# Patient Record
Sex: MALE | Race: BLACK OR AFRICAN AMERICAN | Employment: UNEMPLOYED | URBAN - METROPOLITAN AREA
[De-identification: names, ages, dates, MRNs, and addresses within clinical notes are randomized per-mention and may not be internally consistent; named-entity substitution may affect disease eponyms.]

---

## 2019-11-07 ENCOUNTER — HOSPITAL ENCOUNTER (INPATIENT)
Age: 34
LOS: 12 days | Discharge: HOME | DRG: 750 | End: 2019-11-19
Attending: PSYCHIATRY & NEUROLOGY | Admitting: PSYCHIATRY & NEUROLOGY
Payer: MEDICAID

## 2019-11-07 ENCOUNTER — HOSPITAL ENCOUNTER (EMERGENCY)
Age: 34
Discharge: PSYCHIATRIC OR SUBSTANCE USE FACILITY | End: 2019-11-07
Attending: EMERGENCY MEDICINE
Payer: MEDICAID

## 2019-11-07 VITALS
DIASTOLIC BLOOD PRESSURE: 86 MMHG | OXYGEN SATURATION: 100 % | TEMPERATURE: 97.8 F | BODY MASS INDEX: 21.24 KG/M2 | WEIGHT: 160.27 LBS | HEART RATE: 61 BPM | RESPIRATION RATE: 16 BRPM | SYSTOLIC BLOOD PRESSURE: 138 MMHG | HEIGHT: 73 IN

## 2019-11-07 DIAGNOSIS — F22 DELUSIONAL DISORDER (HCC): ICD-10-CM

## 2019-11-07 DIAGNOSIS — Z00.8 MEDICAL CLEARANCE FOR PSYCHIATRIC ADMISSION: Primary | ICD-10-CM

## 2019-11-07 PROBLEM — F20.9 SCHIZOPHRENIA (HCC): Status: ACTIVE | Noted: 2019-11-07

## 2019-11-07 LAB
ALBUMIN SERPL-MCNC: 4.1 G/DL (ref 3.5–5)
ALBUMIN/GLOB SERPL: 1.6 {RATIO} (ref 1.1–2.2)
ALP SERPL-CCNC: 77 U/L (ref 45–117)
ALT SERPL-CCNC: 22 U/L (ref 12–78)
AMPHET UR QL SCN: NEGATIVE
ANION GAP SERPL CALC-SCNC: 6 MMOL/L (ref 5–15)
AST SERPL-CCNC: 18 U/L (ref 15–37)
BARBITURATES UR QL SCN: NEGATIVE
BASOPHILS # BLD: 0.1 K/UL (ref 0–0.1)
BASOPHILS NFR BLD: 1 % (ref 0–1)
BENZODIAZ UR QL: NEGATIVE
BILIRUB SERPL-MCNC: 0.5 MG/DL (ref 0.2–1)
BUN SERPL-MCNC: 10 MG/DL (ref 6–20)
BUN/CREAT SERPL: 10 (ref 12–20)
CALCIUM SERPL-MCNC: 9.1 MG/DL (ref 8.5–10.1)
CANNABINOIDS UR QL SCN: POSITIVE
CHLORIDE SERPL-SCNC: 104 MMOL/L (ref 97–108)
CO2 SERPL-SCNC: 32 MMOL/L (ref 21–32)
COCAINE UR QL SCN: NEGATIVE
CREAT SERPL-MCNC: 1.03 MG/DL (ref 0.7–1.3)
DIFFERENTIAL METHOD BLD: ABNORMAL
DRUG SCRN COMMENT,DRGCM: ABNORMAL
EOSINOPHIL # BLD: 0.1 K/UL (ref 0–0.4)
EOSINOPHIL NFR BLD: 1 % (ref 0–7)
ERYTHROCYTE [DISTWIDTH] IN BLOOD BY AUTOMATED COUNT: 12 % (ref 11.5–14.5)
ETHANOL SERPL-MCNC: <10 MG/DL
GLOBULIN SER CALC-MCNC: 2.6 G/DL (ref 2–4)
GLUCOSE SERPL-MCNC: 79 MG/DL (ref 65–100)
HCT VFR BLD AUTO: 45 % (ref 36.6–50.3)
HGB BLD-MCNC: 15.1 G/DL (ref 12.1–17)
IMM GRANULOCYTES # BLD AUTO: 0.1 K/UL (ref 0–0.04)
IMM GRANULOCYTES NFR BLD AUTO: 1 % (ref 0–0.5)
LYMPHOCYTES # BLD: 3 K/UL (ref 0.8–3.5)
LYMPHOCYTES NFR BLD: 22 % (ref 12–49)
MCH RBC QN AUTO: 29.7 PG (ref 26–34)
MCHC RBC AUTO-ENTMCNC: 33.6 G/DL (ref 30–36.5)
MCV RBC AUTO: 88.6 FL (ref 80–99)
METHADONE UR QL: NEGATIVE
MONOCYTES # BLD: 1.1 K/UL (ref 0–1)
MONOCYTES NFR BLD: 9 % (ref 5–13)
NEUTS SEG # BLD: 9 K/UL (ref 1.8–8)
NEUTS SEG NFR BLD: 66 % (ref 32–75)
NRBC # BLD: 0 K/UL (ref 0–0.01)
NRBC BLD-RTO: 0 PER 100 WBC
OPIATES UR QL: NEGATIVE
PCP UR QL: NEGATIVE
PLATELET # BLD AUTO: 250 K/UL (ref 150–400)
PMV BLD AUTO: 9.5 FL (ref 8.9–12.9)
POTASSIUM SERPL-SCNC: 4.1 MMOL/L (ref 3.5–5.1)
PROT SERPL-MCNC: 6.7 G/DL (ref 6.4–8.2)
RBC # BLD AUTO: 5.08 M/UL (ref 4.1–5.7)
SODIUM SERPL-SCNC: 142 MMOL/L (ref 136–145)
WBC # BLD AUTO: 13.4 K/UL (ref 4.1–11.1)

## 2019-11-07 PROCEDURE — 65220000003 HC RM SEMIPRIVATE PSYCH

## 2019-11-07 PROCEDURE — 80307 DRUG TEST PRSMV CHEM ANLYZR: CPT

## 2019-11-07 PROCEDURE — 36415 COLL VENOUS BLD VENIPUNCTURE: CPT

## 2019-11-07 PROCEDURE — 80053 COMPREHEN METABOLIC PANEL: CPT

## 2019-11-07 PROCEDURE — 74011250636 HC RX REV CODE- 250/636: Performed by: PSYCHIATRY & NEUROLOGY

## 2019-11-07 PROCEDURE — 85025 COMPLETE CBC W/AUTO DIFF WBC: CPT

## 2019-11-07 PROCEDURE — 99285 EMERGENCY DEPT VISIT HI MDM: CPT

## 2019-11-07 RX ORDER — BENZTROPINE MESYLATE 1 MG/1
1 TABLET ORAL
Status: DISCONTINUED | OUTPATIENT
Start: 2019-11-07 | End: 2019-11-19 | Stop reason: HOSPADM

## 2019-11-07 RX ORDER — ADHESIVE BANDAGE
30 BANDAGE TOPICAL DAILY PRN
Status: DISCONTINUED | OUTPATIENT
Start: 2019-11-07 | End: 2019-11-19 | Stop reason: HOSPADM

## 2019-11-07 RX ORDER — IBUPROFEN 200 MG
1 TABLET ORAL DAILY
Status: DISCONTINUED | OUTPATIENT
Start: 2019-11-08 | End: 2019-11-11

## 2019-11-07 RX ORDER — DIPHENHYDRAMINE HYDROCHLORIDE 50 MG/ML
50 INJECTION, SOLUTION INTRAMUSCULAR; INTRAVENOUS
Status: DISCONTINUED | OUTPATIENT
Start: 2019-11-07 | End: 2019-11-19 | Stop reason: HOSPADM

## 2019-11-07 RX ORDER — TRAZODONE HYDROCHLORIDE 50 MG/1
50 TABLET ORAL
Status: DISCONTINUED | OUTPATIENT
Start: 2019-11-07 | End: 2019-11-19 | Stop reason: HOSPADM

## 2019-11-07 RX ORDER — HYDROXYZINE 50 MG/1
50 TABLET, FILM COATED ORAL
Status: DISCONTINUED | OUTPATIENT
Start: 2019-11-07 | End: 2019-11-19 | Stop reason: HOSPADM

## 2019-11-07 RX ORDER — ACETAMINOPHEN 325 MG/1
650 TABLET ORAL
Status: DISCONTINUED | OUTPATIENT
Start: 2019-11-07 | End: 2019-11-19 | Stop reason: HOSPADM

## 2019-11-07 RX ORDER — LORAZEPAM 2 MG/ML
1 INJECTION INTRAMUSCULAR
Status: DISCONTINUED | OUTPATIENT
Start: 2019-11-07 | End: 2019-11-19 | Stop reason: HOSPADM

## 2019-11-07 RX ORDER — HALOPERIDOL 5 MG/ML
5 INJECTION INTRAMUSCULAR
Status: DISCONTINUED | OUTPATIENT
Start: 2019-11-07 | End: 2019-11-19 | Stop reason: HOSPADM

## 2019-11-07 RX ORDER — OLANZAPINE 5 MG/1
5 TABLET ORAL
Status: DISCONTINUED | OUTPATIENT
Start: 2019-11-07 | End: 2019-11-19 | Stop reason: HOSPADM

## 2019-11-07 RX ADMIN — LORAZEPAM 1 MG: 2 INJECTION, SOLUTION INTRAMUSCULAR; INTRAVENOUS at 17:51

## 2019-11-07 NOTE — BH NOTES
TRANSFER - IN REPORT:    Verbal report received from [facility]  on Jaclyn Alfred  being received from [facility] ED(unit) for routine progression of care      Report consisted of patients Situation, Background, Assessment and   Recommendations(SBAR). Information from the following report(s) SBAR, Kardex and MAR was reviewed with the receiving nurse. Opportunity for questions and clarification was provided. Assessment completed upon patients arrival to unit and care assumed.

## 2019-11-07 NOTE — ED NOTES
Report to [employer] at [facility]. Direct line to nurse's station [phone]  TDO has been issued, awaiting [employer] for transport.

## 2019-11-07 NOTE — BH NOTES
Blocked Bed Documentation:    Room number: 726  Type: Behavior  Rationale: Impulsive  Anticipated duration: TBD

## 2019-11-07 NOTE — ED PROVIDER NOTES
EMERGENCY DEPARTMENT HISTORY AND PHYSICAL EXAM      Date: 11/7/2019  Patient Name: Epi Mitchell    History of Presenting Illness     Chief Complaint   Patient presents with   [address] Problem     History Provided By: Patient    HPI: Epi Mitchell, 27 y.o. male with no known past medical history who presents in police custody under an ECO to the ED with cc of verbal altercation with his mother this morning. 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 He denies any current symptoms including pain, vomiting, diarrhea, or head injuries. He does state that he is supposed to see a psychiatrist but does not go. He denies taking any medications. He does state that he has been seen in the past for similar symptoms to today's events and has been hospitalized in a psychiatric facility. He believes the last time this happened was a proximally 9 years ago. PMHx: None  Social Hx: Occasionally smokes, occasional alcohol use, denies illegal drug use    PCP: None    There are no other complaints, changes, or physical findings at this time. No current facility-administered medications on file prior to encounter. No current outpatient medications on file prior to encounter. Past History     Past Medical History:  Past Medical History:   Diagnosis Date    Psychiatric disorder      Past Surgical History:  History reviewed. No pertinent surgical history. Family History:  History reviewed. No pertinent family history.   Social History:  Social History     Tobacco Use    Smoking status: Never Smoker    Smokeless tobacco: Never Used   Substance Use Topics    Alcohol use: Yes     Comment: occasional    Drug use: Not on file     Comment: denied     Allergies:  No Known Allergies  Review of Systems   Review of Systems   Unable to perform ROS: Psychiatric disorder     Physical Exam   Physical Exam   Constitutional: He appears well-developed and well-nourished. HENT:   Head: Normocephalic and atraumatic. Mouth/Throat: Oropharynx is clear and moist and mucous membranes are normal.   Eyes: EOM are normal.   Neck: Normal range of motion and full passive range of motion without pain. Neck supple. Cardiovascular: Normal rate, regular rhythm, normal heart sounds, intact distal pulses and normal pulses. No murmur heard. Pulmonary/Chest: Effort normal and breath sounds normal. No respiratory distress. He exhibits no tenderness. Abdominal: Soft. Normal appearance and bowel sounds are normal. There is no tenderness. There is no rebound and no guarding. Neurological: He is alert. He has normal strength. Skin: Skin is warm, dry and intact. No rash noted. No erythema. Psychiatric: He has a normal mood and affect. Judgment normal. He is actively hallucinating. Thought content is delusional. He expresses no homicidal and no suicidal ideation. He expresses no suicidal plans and no homicidal plans. Delusional and tangential   Nursing note and vitals reviewed. Diagnostic Study Results   Labs -     Recent Results (from the past 12 hour(s))   ETHYL ALCOHOL    Collection Time: 11/07/19 12:05 PM   Result Value Ref Range    ALCOHOL(ETHYL),SERUM <10 <10 MG/DL   CBC WITH AUTOMATED DIFF    Collection Time: 11/07/19 12:05 PM   Result Value Ref Range    WBC 13.4 (H) 4.1 - 11.1 K/uL    RBC 5.08 4.10 - 5.70 M/uL    HGB 15.1 12.1 - 17.0 g/dL    HCT 45.0 36.6 - 50.3 %    MCV 88.6 80.0 - 99.0 FL    MCH 29.7 26.0 - 34.0 PG    MCHC 33.6 30.0 - 36.5 g/dL    RDW 12.0 11.5 - 14.5 %    PLATELET 030 364 - 855 K/uL    MPV 9.5 8.9 - 12.9 FL    NRBC 0.0 0  WBC    ABSOLUTE NRBC 0.00 0.00 - 0.01 K/uL    NEUTROPHILS 66 32 - 75 %    LYMPHOCYTES 22 12 - 49 %    MONOCYTES 9 5 - 13 %    EOSINOPHILS 1 0 - 7 %    BASOPHILS 1 0 - 1 %    IMMATURE GRANULOCYTES 1 (H) 0.0 - 0.5 %    ABS. NEUTROPHILS 9.0 (H) 1.8 - 8.0 K/UL    ABS.  LYMPHOCYTES 3.0 0.8 - 3.5 K/UL    ABS. MONOCYTES 1.1 (H) 0.0 - 1.0 K/UL    ABS. EOSINOPHILS 0.1 0.0 - 0.4 K/UL    ABS. BASOPHILS 0.1 0.0 - 0.1 K/UL    ABS. IMM. GRANS. 0.1 (H) 0.00 - 0.04 K/UL    DF AUTOMATED     METABOLIC PANEL, COMPREHENSIVE    Collection Time: 11/07/19 12:05 PM   Result Value Ref Range    Sodium 142 136 - 145 mmol/L    Potassium 4.1 3.5 - 5.1 mmol/L    Chloride 104 97 - 108 mmol/L    CO2 32 21 - 32 mmol/L    Anion gap 6 5 - 15 mmol/L    Glucose 79 65 - 100 mg/dL    BUN 10 6 - 20 MG/DL    Creatinine 1.03 0.70 - 1.30 MG/DL    BUN/Creatinine ratio 10 (L) 12 - 20      GFR est AA >60 >60 ml/min/1.73m2    GFR est non-AA >60 >60 ml/min/1.73m2    Calcium 9.1 8.5 - 10.1 MG/DL    Bilirubin, total 0.5 0.2 - 1.0 MG/DL    ALT (SGPT) 22 12 - 78 U/L    AST (SGOT) 18 15 - 37 U/L    Alk. phosphatase 77 45 - 117 U/L    Protein, total 6.7 6.4 - 8.2 g/dL    Albumin 4.1 3.5 - 5.0 g/dL    Globulin 2.6 2.0 - 4.0 g/dL    A-G Ratio 1.6 1.1 - 2.2     DRUG SCREEN, URINE    Collection Time: 11/07/19 12:05 PM   Result Value Ref Range    AMPHETAMINES NEGATIVE  NEG      BARBITURATES NEGATIVE  NEG      BENZODIAZEPINES NEGATIVE  NEG      COCAINE NEGATIVE  NEG      METHADONE NEGATIVE  NEG      OPIATES NEGATIVE  NEG      PCP(PHENCYCLIDINE) NEGATIVE  NEG      THC (TH-CANNABINOL) POSITIVE (A) NEG      Drug screen comment (NOTE)        Radiologic Studies -   No orders to display     No results found. Medical Decision Making   I am the first provider for this patient. I reviewed the vital signs, available nursing notes, past medical history, past surgical history, family history and social history. Vital Signs-Reviewed the patient's vital signs.   Patient Vitals for the past 12 hrs:   Temp Pulse Resp BP SpO2   11/07/19 1448  65  128/84 100 %   11/07/19 1248 98.1 °F (36.7 °C) 61 16 140/77 100 %   11/07/19 1138 98.3 °F (36.8 °C) 66 16 126/75 100 %     Pulse Oximetry Analysis - 100% on RA    Records Reviewed: Nursing Notes    Provider Notes (Medical Decision Making):   26-year-old male presents in police custody under an ECO for medical clearance and psychiatric evaluation. It sounds like he has a history of either bipolar or schizophrenia/schizoaffective disorder. He is clearly delusional and not take any medications. He will need psychiatric admission. Will medically clear and defer psychiatric disposition to [facility] crisis. ED Course:   Initial assessment performed. The patients presenting problems have been discussed, and they are in agreement with the care plan formulated and outlined with them. I have encouraged them to ask questions as they arise throughout their visit. Progress Note  3:21 PM  I have been informed by nursing that patient was accepted by Dr. Eber Gordon under a TDO at [facility] for inpatient psychiatry. Progress Note:   Updated pt on all returned results and findings. Discussed the importance of proper follow up as referred below along with return precautions. Pt in agreement with the care plan and expresses agreement with and understanding of all items discussed. Disposition:  Transfer Note:  3:22 PM  Patient is being transferred to inpatient psychiatry at [facility] under a TDO, transfer accepted by Dr. Eber Gordon. PLAN:  1. Transfer    Diagnosis     Clinical Impression:   1. Medical clearance for psychiatric admission    2. Delusional disorder [facility])            Please note that this dictation was completed with Dragon, computer voice recognition software. Quite often unanticipated grammatical, syntax, homophones, and other interpretive errors are inadvertently transcribed by the computer software. Please disregard these errors. Additionally, please excuse any errors that have escaped final proofreading.

## 2019-11-07 NOTE — BH NOTES
Primary Nurse Tony Benito RN and David Srivastava performed a dual skin assessment on this patient No impairment noted  Jose score is 23    Callus ~5cm mid bottom of left foot  Two dime size purple bruises right upper arm  Sporadic bumps upper back, skin intact    Pressure Injury Documentation  (COMPLETE ONE LABEL PER PRESSURE INJURY)  For further information, please review corresponding Wound Care flowsheet.       Flora Dhaliwal has:    No pressure injury    Tony Benito RN

## 2019-11-07 NOTE — ED NOTES
[employer] officer checked patent upon arrival to [facility] 1. Patient cooperative on arrival to [facility]. Patient's belongings removed and placed in labeled bags; belongings secured in [address] [place] [address] Road locker.

## 2019-11-07 NOTE — BH NOTES
Pt arrived around [phone] under TDO    Pt tracking the room, appearing fearful, unable to follow simple instructions such as vital sign assessment, pt became increasingly agitated \"I am just here for my blood pressure to be checked\"    PRN Medication Documentation    Specific patient behavior that led to need for PRN medication: see above  Staff interventions attempted prior to PRN being given: decreased stimuli, offered emotional support  PRN medication given: ativan 1mg IM at 1751 pt readily exposed skin for administration   Patient response/effectiveness of PRN medication: 40 minutes postadministration pt is eating dinner, has been oriented to unit and verbalizes understanding of confidentiality code.      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

## 2019-11-07 NOTE — ED TRIAGE NOTES
Pt arrived in custody of [place] [employer] on an ECO initiated at 56 by patient's mother. Per PD, pt cooperative en route. PD reported pt demonstrated delusions en route.

## 2019-11-07 NOTE — ED NOTES
Rut Elliott with [employer] spoke with mother again, apparently patient does not have consistent outpatient follow up and has not been prescribed any regular medications over the past year.

## 2019-11-07 NOTE — ED NOTES
Patient with hx of mental health problems, follow up unknown, medications unknown arrives in custody of Onofre FLOYD. Patient lives at home with mother. Mother indicated (to [facility]) that rent prices were increasing and they had been planning a move for several months. No other siblings in household. Patient has flat, monotone affect. Answers questions appropriately and follows commands. Makes direct eye contact with staff. Patient alert and oriented to name, place, time. Fixated on today's events which included his mother packing up their personal property in preparation for a local move. Patient denies auditory, visual or tactile hallucinations. demonstrates delusions of grandeur and states, \"I own [address], people just don't know. \"    Patient also demonstrates paranoid delusions and states, \"I need ya'll to contact the [employer] about her moving. \"  Additionally states, \"I own the whole community, I can't keep moving. \"      When staff inquired about medications and medical history patient stated, \"I haven't been getting my proper medication since 1990. \"      Denies changes to sleep patterns. Reports increased appetite. Demonstrates labile affect and mood. Emergency Department Nursing Plan of Care       The Nursing Plan of Care is developed from the Nursing assessment and Emergency Department Attending provider initial evaluation. The plan of care may be reviewed in the ED Provider note.     The Plan of Care was developed with the following considerations:   Patient / Family readiness to learn indicated by:verbalized understanding  Persons(s) to be included in education: patient  Barriers to Learning/Limitations:No    [address], RN    11/7/2019   12:22 PM

## 2019-11-07 NOTE — ED NOTES
TRANSFER - OUT REPORT:    Verbal report given to Ruthie Daugherty RN (name) on Kathi Mi  being transferred to [facility] bed 726, A (accepted by Dr. Keith Solis) for routine progression of care       Report consisted of patients Situation, Background, Assessment and   Recommendations(SBAR). Information from the following report(s) SBAR was reviewed with the receiving nurse. Lines:       Opportunity for questions and clarification was provided. Patient transported with:  TDO admit, awaiting [place] [employer] for transport.

## 2019-11-08 PROBLEM — D72.829 LEUKOCYTOSIS: Status: ACTIVE | Noted: 2019-11-08

## 2019-11-08 PROBLEM — F12.90 MARIJUANA USE: Status: ACTIVE | Noted: 2019-11-08

## 2019-11-08 LAB
BASOPHILS # BLD: 0.1 K/UL (ref 0–0.1)
BASOPHILS NFR BLD: 1 % (ref 0–1)
CHOLEST SERPL-MCNC: 145 MG/DL
DIFFERENTIAL METHOD BLD: ABNORMAL
EOSINOPHIL # BLD: 0.1 K/UL (ref 0–0.4)
EOSINOPHIL NFR BLD: 1 % (ref 0–7)
ERYTHROCYTE [DISTWIDTH] IN BLOOD BY AUTOMATED COUNT: 11.9 % (ref 11.5–14.5)
GLUCOSE P FAST SERPL-MCNC: 88 MG/DL (ref 65–100)
HCT VFR BLD AUTO: 42.4 % (ref 36.6–50.3)
HDLC SERPL-MCNC: 44 MG/DL
HDLC SERPL: 3.3 {RATIO} (ref 0–5)
HGB BLD-MCNC: 14.4 G/DL (ref 12.1–17)
IMM GRANULOCYTES # BLD AUTO: 0 K/UL (ref 0–0.04)
IMM GRANULOCYTES NFR BLD AUTO: 0 % (ref 0–0.5)
LDLC SERPL CALC-MCNC: 86.6 MG/DL (ref 0–100)
LIPID PROFILE,FLP: NORMAL
LYMPHOCYTES # BLD: 2.9 K/UL (ref 0.8–3.5)
LYMPHOCYTES NFR BLD: 24 % (ref 12–49)
MCH RBC QN AUTO: 29.4 PG (ref 26–34)
MCHC RBC AUTO-ENTMCNC: 34 G/DL (ref 30–36.5)
MCV RBC AUTO: 86.7 FL (ref 80–99)
MONOCYTES # BLD: 1 K/UL (ref 0–1)
MONOCYTES NFR BLD: 9 % (ref 5–13)
NEUTS SEG # BLD: 7.8 K/UL (ref 1.8–8)
NEUTS SEG NFR BLD: 65 % (ref 32–75)
NRBC # BLD: 0 K/UL (ref 0–0.01)
NRBC BLD-RTO: 0 PER 100 WBC
PLATELET # BLD AUTO: 249 K/UL (ref 150–400)
PMV BLD AUTO: 9.4 FL (ref 8.9–12.9)
RBC # BLD AUTO: 4.89 M/UL (ref 4.1–5.7)
TRIGL SERPL-MCNC: 72 MG/DL (ref ?–150)
TSH SERPL DL<=0.05 MIU/L-ACNC: 1.75 UIU/ML (ref 0.36–3.74)
VLDLC SERPL CALC-MCNC: 14.4 MG/DL
WBC # BLD AUTO: 11.8 K/UL (ref 4.1–11.1)

## 2019-11-08 PROCEDURE — 65220000003 HC RM SEMIPRIVATE PSYCH

## 2019-11-08 PROCEDURE — 80061 LIPID PANEL: CPT

## 2019-11-08 PROCEDURE — 82947 ASSAY GLUCOSE BLOOD QUANT: CPT

## 2019-11-08 PROCEDURE — 36415 COLL VENOUS BLD VENIPUNCTURE: CPT

## 2019-11-08 PROCEDURE — 85025 COMPLETE CBC W/AUTO DIFF WBC: CPT

## 2019-11-08 PROCEDURE — 84443 ASSAY THYROID STIM HORMONE: CPT

## 2019-11-08 PROCEDURE — 74011250637 HC RX REV CODE- 250/637: Performed by: NURSE PRACTITIONER

## 2019-11-08 RX ORDER — DIVALPROEX SODIUM 500 MG/1
1000 TABLET, EXTENDED RELEASE ORAL
Status: DISCONTINUED | OUTPATIENT
Start: 2019-11-08 | End: 2019-11-13

## 2019-11-08 RX ORDER — RISPERIDONE 1 MG/1
1 TABLET, FILM COATED ORAL 2 TIMES DAILY
Status: DISCONTINUED | OUTPATIENT
Start: 2019-11-08 | End: 2019-11-08

## 2019-11-08 RX ORDER — RISPERIDONE 1 MG/1
1 TABLET, FILM COATED ORAL 2 TIMES DAILY
Status: DISCONTINUED | OUTPATIENT
Start: 2019-11-08 | End: 2019-11-09

## 2019-11-08 RX ORDER — HALOPERIDOL 5 MG/ML
5 INJECTION INTRAMUSCULAR 2 TIMES DAILY
Status: DISCONTINUED | OUTPATIENT
Start: 2019-11-08 | End: 2019-11-09

## 2019-11-08 RX ADMIN — RISPERIDONE 1 MG: 1 TABLET ORAL at 16:19

## 2019-11-08 RX ADMIN — DIVALPROEX SODIUM 1000 MG: 500 TABLET, EXTENDED RELEASE ORAL at 21:06

## 2019-11-08 RX ADMIN — RISPERIDONE 1 MG: 1 TABLET ORAL at 21:06

## 2019-11-08 NOTE — PROGRESS NOTES
Cooperative. Medication compliant. Lacking insight regarding mental illness and attributing factors to admission. Verbalizing grandiose delusions at times.        Problem: Altered Thought Process (Adult/Pediatric)  Goal: *STG: Participates in treatment plan  Outcome: Progressing Towards Goal  Goal: *STG: Remains safe in hospital  Outcome: Progressing Towards Goal  Goal: *STG: Complies with medication therapy  Outcome: Progressing Towards Goal  Goal: *STG: Absence of lethality  Outcome: Progressing Towards Goal

## 2019-11-08 NOTE — PROGRESS NOTES
Malodorous, verbalizing multiple delusions of grandeur, nonsensical statements, tracking room in suspicious manner.    Problem: Altered Thought Process (Adult/Pediatric)  Goal: *STG: Absence of lethality  Outcome: Progressing Towards Goal     Problem: Altered Thought Process (Adult/Pediatric)  Goal: *STG: Decreased delusional thinking  Outcome: Not Progressing Towards Goal  Goal: *STG: Demonstrates ability to understand and use improved judgment in daily activities and relationships  Outcome: Not Progressing Towards Goal

## 2019-11-08 NOTE — BH NOTES
Dr. Edilson Bermudez, triage hospitialist paged related to H and P  Per Dr. Pauline Woodson, floor consult hospitalist, Dr. Edilson Bermudez to be made aware of consult  Dr. Edilson Bermudez requested H and P consult be called again morning of 11/8/19

## 2019-11-08 NOTE — PROGRESS NOTES
Problem: Falls - Risk of  Goal: *Absence of Falls  Description  Document Rebbecca Persons Fall Risk and appropriate interventions in the flowsheet. Outcome: Progressing Towards Goal  Note:   Fall Risk Interventions:            Medication Interventions: Teach patient to arise slowly, Patient to call before getting OOB        Resting in bed with eyes closed, no complaints, no distress noted. Safety measures in place. Will continue to monitor.           Blocked Bed Documentation:     Room number: 726  Type: Behavior  Rationale: Impulsive  Anticipated duration: TBD

## 2019-11-08 NOTE — PROGRESS NOTES
Admission Medication Reconciliation:    Information obtained from:  Patient interview, review of insurance claims data (none), and review of [facility]   RxQuery data available¹:  NO    Comments/Recommendations: Updated PTA meds/reviewed patient's allergies. 1)  Difficult to interview the patient given grandiose delusional thinking. 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 \"     The patient denies taking any prescription medications prior to admission. 2)  Medication changes (since last review): none    3)  The [place] Prescription Monitoring Program () was assessed to determine fill history of any controlled medications. Unable to locate the patient in the database. ¹RxQuery pharmacy benefit data reflects medications filled and processed through the patient's insurance, however   this data does NOT capture whether the medication was picked up or is currently being taken by the patient. Allergies:  Patient has no known allergies. Significant PMH/Disease States:   Past Medical History:   Diagnosis Date    Psychiatric disorder      Chief Complaint for this Admission:  No chief complaint on file.     Prior to Admission Medications:   None     Jarrett Najeras [place]

## 2019-11-08 NOTE — BH NOTES
1735- pt comes out of his room and asks to be discharged. Education provided. Pt return to his room. Pt appears distracted. Delusions of grandeur , poor insight. Restless distracted.

## 2019-11-08 NOTE — PROGRESS NOTES
Laboratory Monitoring for Antipsychotics: This patient is currently prescribed the following medication(s):   Current Facility-Administered Medications   Medication Dose Route Frequency    risperiDONE (RisperDAL) tablet 1 mg  1 mg Oral BID    divalproex ER (DEPAKOTE ER) 24 hour tablet 1,000 mg  1,000 mg Oral QHS    nicotine (NICODERM CQ) 21 mg/24 hr patch 1 Patch  1 Patch TransDERmal DAILY     The following labs have been completed for monitoring of antipsychotics and/or mood stabilizers:    Height, Weight, BMI Estimation  Estimated body mass index is 21.11 kg/m² as calculated from the following:    Height as of this encounter: 185.4 cm (73\"). Weight as of this encounter: 72.6 kg (160 lb). Vital Signs/Blood Pressure  Visit Vitals  /86   Pulse 61   Temp 98.3 °F (36.8 °C)   Resp 16   Ht 185.4 cm (73\")   Wt 72.6 kg (160 lb)   SpO2 98%   BMI 21.11 kg/m²     Renal Function, Hepatic Function and Chemistry  Estimated Creatinine Clearance: 102.8 mL/min (based on SCr of 1.03 mg/dL). Lab Results   Component Value Date/Time    Sodium 142 11/07/2019 12:05 PM    Potassium 4.1 11/07/2019 12:05 PM    Chloride 104 11/07/2019 12:05 PM    CO2 32 11/07/2019 12:05 PM    Anion gap 6 11/07/2019 12:05 PM    BUN 10 11/07/2019 12:05 PM    Creatinine 1.03 11/07/2019 12:05 PM    BUN/Creatinine ratio 10 (L) 11/07/2019 12:05 PM    Bilirubin, total 0.5 11/07/2019 12:05 PM    Protein, total 6.7 11/07/2019 12:05 PM    Albumin 4.1 11/07/2019 12:05 PM    Globulin 2.6 11/07/2019 12:05 PM    A-G Ratio 1.6 11/07/2019 12:05 PM    ALT (SGPT) 22 11/07/2019 12:05 PM    Alk.  phosphatase 77 11/07/2019 12:05 PM     Lab Results   Component Value Date/Time    Glucose 88 11/08/2019 05:49 AM    Glucose (POC) 82 09/09/2009 05:35 PM     No results found for: HBA1C, HGBE8, BEQ1RHPF    Hematology  Lab Results   Component Value Date/Time    WBC 13.4 (H) 11/07/2019 12:05 PM    RBC 5.08 11/07/2019 12:05 PM    HGB 15.1 11/07/2019 12:05 PM    HCT 45.0 11/07/2019 12:05 PM    MCV 88.6 11/07/2019 12:05 PM    MCH 29.7 11/07/2019 12:05 PM    MCHC 33.6 11/07/2019 12:05 PM    RDW 12.0 11/07/2019 12:05 PM    PLATELET 063 66/38/6322 12:05 PM     Lipids  Lab Results   Component Value Date/Time    Cholesterol, total 145 11/08/2019 05:49 AM    HDL Cholesterol 44 11/08/2019 05:49 AM    LDL, calculated 86.6 11/08/2019 05:49 AM    Triglyceride 72 11/08/2019 05:49 AM    CHOL/HDL Ratio 3.3 11/08/2019 05:49 AM     Thyroid Function  Lab Results   Component Value Date/Time    TSH 1.75 11/08/2019 05:49 AM     Assessment/Plan:  Recommended baseline laboratory monitoring has been completed based on this patient's current medication regimen. No further interventions are needed at this time. Follow-up metabolic monitoring labs should be completed in 3 months (quarterly for the first year of antipsychotic therapy).      Maya Sosa, SIMIND

## 2019-11-08 NOTE — BH NOTES
PSYCHOSOCIAL ASSESSMENT  :Patient identifying info:  Gregorio Anton is a 27 y.o., male admitted 2019  5:29 PM     Presenting problem and precipitating factors: He was assessed at [facility] at the [facility] due to agitated and threatening behavior. Sent to [facility] on a TDO. He is very delusional \" I own a hospital\" . Mental status assessment:alert - oriented x's 3 - delusional and guarded     Strengths: supportive mother - safe housing     Collateral information: mother - Mathew Juárez [phone]    Current psychiatric /substance abuse providers and contact info: no current provider     Previous psychiatric/substance abuse providers and response to treatment: unknown    Family history of mental illness or substance abuse: unknown    Substance abuse history:    Social History     Tobacco Use    Smoking status: Never Smoker    Smokeless tobacco: Never Used   Substance Use Topics    Alcohol use: Yes     Comment: occasional       History of biomedical complications associated with substance abuse :none noted     Patient's current acceptance of treatment or motivation for change:    Family constellation: single, no children     Is significant other involved?        Describe support system: mother     Describe living arrangements and home environment:lives at home with his mother     Health issues:   Hospital Problems  Never Reviewed          Codes Class Noted POA    Schizophrenia [facility]) ICD-10-CM: F20.9  ICD-9-CM: 295.90  2019 Unknown              Trauma history: none noted     Legal issues: no    History of  service: no    Financial status: unknown    Hoahaoism/cultural factors: none noted     Education/work history: unknown     Have you been licensed as a health care professional (current or ): no    Leisure and recreation preferences: unknown    Describe coping skills:ineffectual     Amos Melchor  2019
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
## 2019-11-08 NOTE — INTERDISCIPLINARY ROUNDS
Behavioral Health Interdisciplinary Rounds     Patient Name: Moshe Mclain  Age: 27 y.o. Room/Bed:  726/  Primary Diagnosis: <principal problem not specified>   Admission Status: Involuntary Commitment and Forced Medication Order     Readmission within 30 days: no  Power of  in place: no  Patient requires a blocked bed: yes          Reason for blocked bed: Impusive    VTE Prophylaxis: No    Mobility needs/Fall risk: no  Flu Vaccine : no   Nutritional Plan: no  Consults: Hosp H+P         Labs/Testing due today?: yes    Sleep hours:  8      Participation in Care/Groups:  New admit  Medication Compliant?: New admit  PRNS (last 24 hours):  Antianxiety    Restraints (last 24 hours):  no     CIWA (range last 24 hours):     COWS (range last 24 hours):      Alcohol screening (AUDIT) completed -   AUDIT Score: 0     If applicable, date SBIRT discussed in treatment team AND documented:   AUDIT Screen Score: AUDIT Score: 0        Tobacco - patient is a smoker: Have You Used Tobacco in the Past 30 Days: No  Illegal Drugs use: Have You Used Any Illegal Substances Over the Past 12 Months: Yes    24 hour chart check complete: yes     Patient goal(s) for today:   Treatment team focus/goals:   Progress note     LOS:  1  Expected LOS:     Financial concerns/prescription coverage:   Family contact:     Family requesting physician contact today:    Discharge plan:   Access to weapons :       Outpatient provider(s):   Patient's preferred phone number for follow up call :     Participating treatment team members: Jamietrinidad Mclain, * (assigned SW),

## 2019-11-08 NOTE — BH NOTES
Dr. Ferrari Fees paged related to outstanding H and P  Mary Erazo aware of oustanding H and P, planning to see patient this evening.

## 2019-11-09 PROCEDURE — 74011250637 HC RX REV CODE- 250/637: Performed by: NURSE PRACTITIONER

## 2019-11-09 PROCEDURE — 65220000003 HC RM SEMIPRIVATE PSYCH

## 2019-11-09 RX ORDER — HALOPERIDOL 5 MG/ML
5 INJECTION INTRAMUSCULAR 2 TIMES DAILY
Status: DISCONTINUED | OUTPATIENT
Start: 2019-11-09 | End: 2019-11-13

## 2019-11-09 RX ORDER — RISPERIDONE 1 MG/1
2 TABLET, FILM COATED ORAL 2 TIMES DAILY
Status: DISCONTINUED | OUTPATIENT
Start: 2019-11-09 | End: 2019-11-13

## 2019-11-09 RX ADMIN — DIVALPROEX SODIUM 1000 MG: 500 TABLET, EXTENDED RELEASE ORAL at 21:05

## 2019-11-09 RX ADMIN — RISPERIDONE 1 MG: 1 TABLET ORAL at 08:20

## 2019-11-09 RX ADMIN — RISPERIDONE 2 MG: 1 TABLET ORAL at 21:05

## 2019-11-09 NOTE — PROGRESS NOTES
Chief Complaint:  \"I am doing good\"    Interval History:  Patient is here involuntary and has court ordered meds. Patient is delusional and thinks he is a , and was previously the president. Patient denies SI/HI/AVH , but is observed preoccupied and responding to internal stimuli. Increased Risperdal.       Labs:  Lab Results   Component Value Date/Time    WBC 11.8 (H) 11/08/2019 08:13 PM    Hemoglobin (POC) 16.7 09/09/2009 05:35 PM    HGB 14.4 11/08/2019 08:13 PM    Hematocrit (POC) 49 09/09/2009 05:35 PM    HCT 42.4 11/08/2019 08:13 PM    PLATELET 522 07/28/7480 08:13 PM    MCV 86.7 11/08/2019 08:13 PM      Lab Results   Component Value Date/Time    Sodium 142 11/07/2019 12:05 PM    Potassium 4.1 11/07/2019 12:05 PM    Chloride 104 11/07/2019 12:05 PM    CO2 32 11/07/2019 12:05 PM    Anion gap 6 11/07/2019 12:05 PM    Glucose 88 11/08/2019 05:49 AM    BUN 10 11/07/2019 12:05 PM    Creatinine 1.03 11/07/2019 12:05 PM    BUN/Creatinine ratio 10 (L) 11/07/2019 12:05 PM    GFR est AA >60 11/07/2019 12:05 PM    GFR est non-AA >60 11/07/2019 12:05 PM    Calcium 9.1 11/07/2019 12:05 PM    Bilirubin, total 0.5 11/07/2019 12:05 PM    AST (SGOT) 18 11/07/2019 12:05 PM    Alk.  phosphatase 77 11/07/2019 12:05 PM    Protein, total 6.7 11/07/2019 12:05 PM    Albumin 4.1 11/07/2019 12:05 PM    Globulin 2.6 11/07/2019 12:05 PM    A-G Ratio 1.6 11/07/2019 12:05 PM    ALT (SGPT) 22 11/07/2019 12:05 PM      Vitals:    11/08/19 1138 11/08/19 1539 11/08/19 2010 11/09/19 0802   BP: 129/86 (!) 136/96 115/76 125/84   Pulse: 61 69 79 72   Resp: 16 18 16 16   Temp: 98.3 °F (36.8 °C) 98.6 °F (37 °C) 98.3 °F (36.8 °C) 97.8 °F (36.6 °C)   SpO2:  99% 97% 98%   Weight:       Height:             Current Facility-Administered Medications   Medication Dose Route Frequency Provider Last Rate Last Dose    risperiDONE (RisperDAL) tablet 2 mg  2 mg Oral BID Mike Jacobs NP        Or    haloperidol lactate (HALDOL) injection 5 mg  5 mg IntraMUSCular BID Catarina Marino NP        divalproex ER (DEPAKOTE ER) 24 hour tablet 1,000 mg  1,000 mg Oral QHS Nimisha Craige A, NP   1,000 mg at 11/08/19 2106    OLANZapine (ZyPREXA) tablet 5 mg  5 mg Oral Q6H PRN Bill Kelly MD        haloperidol lactate (HALDOL) injection 5 mg  5 mg IntraMUSCular Q6H PRN Bill Kelly MD        benztropine (COGENTIN) tablet 1 mg  1 mg Oral BID PRN Bill Kelly, MD        diphenhydrAMINE (BENADRYL) injection 50 mg  50 mg IntraMUSCular BID PRN Bill Kelly MD        hydrOXYzine HCl (ATARAX) tablet 50 mg  50 mg Oral TID PRN Bill Kelly, MD        LORazepam (ATIVAN) injection 1 mg  1 mg IntraMUSCular Q4H PRN Bill Kelly, MD   1 mg at 11/07/19 1751    traZODone (DESYREL) tablet 50 mg  50 mg Oral QHS PRN Bill Kelly MD        acetaminophen (TYLENOL) tablet 650 mg  650 mg Oral Q4H PRN iBll Kelly MD        magnesium hydroxide (MILK OF MAGNESIA) 400 mg/5 mL oral suspension 30 mL  30 mL Oral DAILY PRN Bill Kelly MD        nicotine (NICODERM CQ) 21 mg/24 hr patch 1 Patch  1 Patch TransDERmal DAILY Bill Kelly MD           Assessment and Plan:  Destinee See meets criteria for a diagnosis of Schizophrenia. Continue the medication regimen as prescribed  Disposition planning to continue. I certify that this patients inpatient psychiatric hospital services furnished since the previous certification were, and continue to be, required for treatment that could reasonably be expected to improve the patient's condition, or for diagnostic study, and that the patient continues to need, on a daily basis, active treatment furnished directly by or requiring the supervision of inpatient psychiatric facility personnel. In addition, the hospital records show that services furnished were intensive treatment services, admission or related services, or equivalent services.

## 2019-11-09 NOTE — PROGRESS NOTES
Problem: Falls - Risk of  Goal: *Absence of Falls  Description  Document Mervat Jeddo Fall Risk and appropriate interventions in the flowsheet. Outcome: Progressing Towards Goal  Note:   Fall Risk Interventions:       Mentation Interventions: Adequate sleep, hydration, pain control    Medication Interventions: Teach patient to arise slowly    Resting in bed with eyes closed, no complaints, no distress noted. Safety measures in place, will continue to monitor.       Blocked Bed Documentation:     Room number: 726  Type: Behavior  Rationale: Impulsive  Anticipated duration: TBD
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
## 2019-11-09 NOTE — CONSULTS
Hospitalist H&P Note         Adelina Abad, ELIESER  [phone] or TigerText  Call physician on-call through the  7pm-7am    Primary Care Provider: None  Source of Information: Patient, chart    History of Presenting Illness:   Lashanda Mcbride is a 27 y.o. male with PMH significant for behavioral disturbance, schizophrenia who is admitted to [facility] unit for schizophrenia with delusions. Pt is seen and examined privately today and denied any concerns or complaints. Specifically he denied any shortness of breath, chest pain, palpitations, dizziness, headache, difficulty urinating, pain or myalgias. The patient is alert oriented to self, place, time, situation. He has a strong body odor, suspect poor hygiene. Patient denies any medical history other than behavioral problems. He denies any home medications for medical problems. He denies any surgeries. He denies any family history. He reports that he does drink alcohol approximately once a month. He states that he does not smoke but does use marijuana and black in miles. He reports that he has a prescription for marijuana for his behavioral issues. Of note his white blood cells were slightly elevated on admission, suspect this is due to lack of p.o. intake with recent history of paranoia and delusions.        Review of Systems:  General: negative for fever, chills, sweats, weakness, weight loss  Eyes: negative for changes or loss in vision, eye pain  Ear Nose and Throat: negative for rhinorrhea, otalgia, speech or swallowing difficulties  Respiratory:  negative for cough, sputum production, SOB, wheezing, GOTTLIEB  Cardiology:  negative for chest pain, palpitations, orthopnea, edema, syncope   Gastrointestinal: negative for abdominal pain, N/V, change in bowel habits, bleeding  Genitourinary: negative for frequency, urgency, dysuria, hematuria, incontinence  Musculoskeletal : negative for arthralgia, myalgia  Skin: negative for easy bruising, bleeding, negative for rash, ulceration, new lesion  Endocrine: negative for hot flashes or polydipsia  Neurological: negative for headache, dizziness, confusion or memory loss, focal weakness, paresthesia, gait disturbance  Psychological: negative for +anxiety, neg depression, agitation      Past Medical History:   Diagnosis Date    Marijuana use 11/8/2019    Psychiatric disorder       History reviewed. No pertinent surgical history. Prior to Admission medications    Not on File     No Known Allergies   History reviewed. No pertinent family history. Extended Emergency Contact Information  Primary Emergency Contact: none,none  Relation: None     SOCIAL HISTORY:  Patient resides:  Independently    Assisted Living    SNF    With family care X      Smoking history:   None    Former    Chronic X     Alcohol history:   None    Social X   Chronic      Substance Abuse history:   No    Yes X   Substance(s):THC      Ambulates:   Independently X   w/cane    w/walker    w/wc      CODE STATUS:  DNR    Full X   Other      Objective:   Physical Exam:   Visit Vitals  /76 (BP 1 Location: Left arm, BP Patient Position: Sitting)   Pulse 79   Temp 98.3 °F (36.8 °C)   Resp 16   Ht 6' 1\" (1.854 m)   Wt 72.6 kg (160 lb)   SpO2 97%   BMI 21.11 kg/m²           General:  Alert, cooperative, no distress, appears stated age, +strong body odor    HEENT:  Normocephalic, atraumatic. Conjunctivae/corneas clear. PERRL, EOMs intact. Nares nl. Septum midline. Mucosa nl. No drainage or sinus tenderness. Lips, mucosa, and tongue nl. Neck: Supple, symmetrical, trachea midline, no adenopathy, thyroid: no enlargement/tenderness/nodules    Back:   Symmetric, no curvature. ROM nl. No CVA tenderness. Lungs:   Clear to auscultation bilaterally. No Wheezing/Rhonchi/Rales. No SOB, no accessory muscle use    Heart:  Regular rate and rhythm, S1, S2 nl, no murmur, click, rub or gallop. Abdomen:   Soft, non-tender, no distention. Bowel sounds nl. Extremities: Extremities nl, atraumatic, no clubbing, cyanosis or edema, long finger nails    Skin: Skin color, texture, turgor nl. No rashes or lesions. Cap refill <3 sec    Psych: Cooperative,  A/O x 3. +paranoid behavior, +anxious    Neurologic: CNII-XII grossly intact. no focal neurological deficits,  moving all extremities, speech clear      EKG:   NA    Data Review:   Recent Days:  Recent Results (from the past 24 hour(s))   GLUCOSE, FASTING    Collection Time: 11/08/19  5:49 AM   Result Value Ref Range    Glucose 88 65 - 100 MG/DL   LIPID PANEL    Collection Time: 11/08/19  5:49 AM   Result Value Ref Range    LIPID PROFILE          Cholesterol, total 145 <200 MG/DL    Triglyceride 72 <150 MG/DL    HDL Cholesterol 44 MG/DL    LDL, calculated 86.6 0 - 100 MG/DL    VLDL, calculated 14.4 MG/DL    CHOL/HDL Ratio 3.3 0.0 - 5.0     TSH 3RD GENERATION    Collection Time: 11/08/19  5:49 AM   Result Value Ref Range    TSH 1.75 0.36 - 3.74 uIU/mL        Imaging: NA      Assessment & Plan     Schizophrenia  -Mgt per psych    Marijuana use   -Apparently has a prescription for this     Leukocytosis  -Suspect 2/2 poor PO intake  -Recheck WBC now. If no temp or other s/sx infection would recommend follow up with PCP if WBC not WNL on repeat  -Encourage PO fluids. Discharge recommendations: Follow up with PCP on discharge if WBCs remain elevated    The patient is medically stable for IP psych admission. Thank you for giving us opportunity to participate in this patients care. Will sign off at this time, please re-consult if there are any further medical management needs or questions.     [facility]  Adult Hospitalists    I can be reached via [employer] or at [phone]       Signed By: Jayesh Barry NP     November 8, 2019

## 2019-11-09 NOTE — INTERDISCIPLINARY ROUNDS
Behavioral Health Interdisciplinary Rounds     Patient Name: Jazz Hernandez  Age: 27 y.o. Room/Bed:  726/  Primary Diagnosis: <principal problem not specified>   Admission Status: Involuntary Commitment and Forced Medication Order     Readmission within 30 days: no  Power of  in place: no  Patient requires a blocked bed: yes          Reason for blocked bed: Impulsive    VTE Prophylaxis: No    Mobility needs/Fall risk: no  Flu Vaccine : no   Nutritional Plan: no  Consults:          Labs/Testing due today?: no    Sleep hours:  6 3/4      Participation in Care/Groups:  no  Medication Compliant?: Yes  PRNS (last 24 hours): None    Restraints (last 24 hours):  no     CIWA (range last 24 hours):     COWS (range last 24 hours):      Alcohol screening (AUDIT) completed -   AUDIT Score: 0     If applicable, date SBIRT discussed in treatment team AND documented:   AUDIT Screen Score: AUDIT Score: 0      Document Brief Intervention (corresponds directly with the 5 A's, Ask, Advise, Assess, Assist, and Arrange): At- Risk Patients (Score 7-15 for women; 8-15 for men)  Discuss concern patient is drinking at unhealthy levels known to increase risk of alcohol-related health problems. Is Patient ready to commit to change? If No:   Encourage reflection   Discuss short term and long term health risks of consuming alcohol   Barriers to change   Reaffirm willingness to help / Educational materials provided  If Yes:   Set goal  [facility] provided    Harmful use or Dependence (Score 16 or greater)   Discuss short term and long term health risks of consuming alcohol   Recommendations   Negotiate drinking goal   Recommend addiction specialist/center   Arrange follow-up appointments.     Tobacco - patient is a smoker: Have You Used Tobacco in the Past 30 Days: No  Illegal Drugs use: Have You Used Any Illegal Substances Over the Past 12 Months: Yes    24 hour chart check complete: yes Patient goal(s) for today:   Treatment team focus/goals:   Progress note     LOS:  2  Expected LOS:     Financial concerns/prescription coverage:    Family contact:    Family requesting physician contact today:    Discharge plan:   Access to weapons :       Outpatient provider(s):   Patient's preferred phone number for follow up call :     Participating treatment team members: Catalina Whitaker, * (assigned SW),

## 2019-11-09 NOTE — PROGRESS NOTES
Problem: Altered Thought Process (Adult/Pediatric)  Goal: *STG: Participates in treatment plan  Outcome: Not Progressing Towards Goal  Note:   Pt. Met in treatment team   continues to have delusional thinking stating he has been the president ,a  , and a doctor  encouraged to be medication compliant      Problem: Altered Thought Process (Adult/Pediatric)  Goal: *STG: Complies with medication therapy  Outcome: Progressing Towards Goal  Note:   Medication compliant  on court ordered medications     reviewed in treatment team  risperdal increased     Problem: Altered Thought Process (Adult/Pediatric)  Goal: *STG: Attends activities and groups  Outcome: Not Progressing Towards Goal  Note:   Isolates in his room     Problem: Altered Thought Process (Adult/Pediatric)  Goal: *STG: Decreased delusional thinking  Outcome: Not Progressing Towards Goal     Problem: Altered Thought Process (Adult/Pediatric)  Goal: Interventions  Outcome: Not Progressing Towards Goal  Note:   Will continue to monitor  on 15 min. checks for safety   assess thought process    medication compliance and effectiveness  encourage unit participation     Blocked Bed Documentation:    Room number: 726  Type: Behavior  Rationale: Poor Sleep Pattern  Anticipated duration: assess every shift  Additional comments: poor hygeine

## 2019-11-10 PROCEDURE — 65220000003 HC RM SEMIPRIVATE PSYCH

## 2019-11-10 PROCEDURE — 74011250637 HC RX REV CODE- 250/637: Performed by: NURSE PRACTITIONER

## 2019-11-10 RX ADMIN — RISPERIDONE 2 MG: 1 TABLET ORAL at 08:32

## 2019-11-10 RX ADMIN — RISPERIDONE 2 MG: 1 TABLET ORAL at 20:33

## 2019-11-10 RX ADMIN — DIVALPROEX SODIUM 1000 MG: 500 TABLET, EXTENDED RELEASE ORAL at 20:34

## 2019-11-10 NOTE — INTERDISCIPLINARY ROUNDS
Behavioral Health Interdisciplinary Rounds     Patient Name: Tejal Tuttle  Age: 27 y.o. Room/Bed:  726/  Primary Diagnosis: <principal problem not specified>   Admission Status: Involuntary Commitment and Forced Medication Order     Readmission within 30 days: no  Power of  in place: no  Patient requires a blocked bed: yes          Reason for blocked bed: Impulsive    VTE Prophylaxis: No    Mobility needs/Fall risk: no  Flu Vaccine : no   Nutritional Plan: no  Consults:          Labs/Testing due today?: no    Sleep hours:  6.5     Participation in Care/Groups:  no  Medication Compliant?: Yes  PRNS (last 24 hours): None    Restraints (last 24 hours):  no     CIWA (range last 24 hours):     COWS (range last 24 hours):      Alcohol screening (AUDIT) completed -   AUDIT Score: 0     If applicable, date SBIRT discussed in treatment team AND documented:   AUDIT Screen Score: AUDIT Score: 0      Document Brief Intervention (corresponds directly with the 5 A's, Ask, Advise, Assess, Assist, and Arrange): At- Risk Patients (Score 7-15 for women; 8-15 for men)  Discuss concern patient is drinking at unhealthy levels known to increase risk of alcohol-related health problems. Is Patient ready to commit to change? If No:   Encourage reflection   Discuss short term and long term health risks of consuming alcohol   Barriers to change   Reaffirm willingness to help / Educational materials provided  If Yes:   Set goal  [employer] provided    Harmful use or Dependence (Score 16 or greater)   Discuss short term and long term health risks of consuming alcohol   Recommendations   Negotiate drinking goal   Recommend addiction specialist/center   Arrange follow-up appointments.     Tobacco - patient is a smoker: Have You Used Tobacco in the Past 30 Days: No  Illegal Drugs use: Have You Used Any Illegal Substances Over the Past 12 Months: Yes    24 hour chart check complete: yes Patient goal(s) for today:   Treatment team focus/goals:   Progress note     LOS:  3  Expected LOS:     Financial concerns/prescription coverage:    Family contact:    Family requesting physician contact today:    Discharge plan:   Access to weapons :       Outpatient provider(s):   Patient's preferred phone number for follow up call :     Participating treatment team members: Debra Rivera, * (assigned SW),

## 2019-11-10 NOTE — PROGRESS NOTES
Chief Complaint:\"I am okay today\"      Interval History:  Patient continues to be delusional and thinks he is a . No reactions to medication increase of Risperdal and patient states, \"I see a little improvement. \" Our on milieu with peers eating breakfast.\"      Past Medical History:  Past Medical History:   Diagnosis Date    Marijuana use 11/8/2019    Psychiatric disorder            Labs:  Lab Results   Component Value Date/Time    WBC 11.8 (H) 11/08/2019 08:13 PM    Hemoglobin (POC) 16.7 09/09/2009 05:35 PM    HGB 14.4 11/08/2019 08:13 PM    Hematocrit (POC) 49 09/09/2009 05:35 PM    HCT 42.4 11/08/2019 08:13 PM    PLATELET 689 04/63/3069 08:13 PM    MCV 86.7 11/08/2019 08:13 PM      Lab Results   Component Value Date/Time    Sodium 142 11/07/2019 12:05 PM    Potassium 4.1 11/07/2019 12:05 PM    Chloride 104 11/07/2019 12:05 PM    CO2 32 11/07/2019 12:05 PM    Anion gap 6 11/07/2019 12:05 PM    Glucose 88 11/08/2019 05:49 AM    BUN 10 11/07/2019 12:05 PM    Creatinine 1.03 11/07/2019 12:05 PM    BUN/Creatinine ratio 10 (L) 11/07/2019 12:05 PM    GFR est AA >60 11/07/2019 12:05 PM    GFR est non-AA >60 11/07/2019 12:05 PM    Calcium 9.1 11/07/2019 12:05 PM    Bilirubin, total 0.5 11/07/2019 12:05 PM    AST (SGOT) 18 11/07/2019 12:05 PM    Alk.  phosphatase 77 11/07/2019 12:05 PM    Protein, total 6.7 11/07/2019 12:05 PM    Albumin 4.1 11/07/2019 12:05 PM    Globulin 2.6 11/07/2019 12:05 PM    A-G Ratio 1.6 11/07/2019 12:05 PM    ALT (SGPT) 22 11/07/2019 12:05 PM      Vitals:    11/09/19 1252 11/09/19 1600 11/09/19 2000 11/10/19 0756   BP: 141/84 125/87 138/78 133/80   Pulse: 87 90 74 95   Resp: 16 16 18 18   Temp: 98.1 °F (36.7 °C) 98.6 °F (37 °C) 97.8 °F (36.6 °C) 98 °F (36.7 °C)   SpO2:  99% 99% 98%   Weight:       Height:             Current Facility-Administered Medications   Medication Dose Route Frequency Provider Last Rate Last Dose    risperiDONE (RisperDAL) tablet 2 mg  2 mg Oral BID Jacobs, Alveta Guido, NP   2 mg at 11/09/19 2105    Or    haloperidol lactate (HALDOL) injection 5 mg  5 mg IntraMUSCular BID Burnie Blocker, NP        divalproex ER (DEPAKOTE ER) 24 hour tablet 1,000 mg  1,000 mg Oral QHS Maria Craig, NP   1,000 mg at 11/09/19 2105    OLANZapine (ZyPREXA) tablet 5 mg  5 mg Oral Q6H PRN Imani Sanches MD        haloperidol lactate (HALDOL) injection 5 mg  5 mg IntraMUSCular Q6H PRN Imani Sanches MD        benztropine (COGENTIN) tablet 1 mg  1 mg Oral BID PRN Imani Sanches MD        diphenhydrAMINE (BENADRYL) injection 50 mg  50 mg IntraMUSCular BID PRN Imani Sanches MD        hydrOXYzine HCl (ATARAX) tablet 50 mg  50 mg Oral TID PRN Imani Sanches MD        LORazepam (ATIVAN) injection 1 mg  1 mg IntraMUSCular Q4H PRN Imani Sanches MD   1 mg at 11/07/19 1751    traZODone (DESYREL) tablet 50 mg  50 mg Oral QHS PRN Imani Sanches MD        acetaminophen (TYLENOL) tablet 650 mg  650 mg Oral Q4H PRN Imani Sanches MD        magnesium hydroxide (MILK OF MAGNESIA) 400 mg/5 mL oral suspension 30 mL  30 mL Oral DAILY PRN Imani Sanches MD        nicotine (NICODERM CQ) 21 mg/24 hr patch 1 Patch  1 Patch TransDERmal DAILY Imani Sanches MD           Mental Status Exam:  Eye contact: Good   Psychomotor activity: calm in chair  Speech is spontaneous  Mood is preoccupied  Affect: full range  Perception:poor  Suicidal ideation: denies SI/HI  Cognition is grossly intact        Assessment and Plan:  Monisha Ibrahim meets criteria for a diagnosis of Schizophrenia. Continue the medication regimen as prescribed  Disposition planning to continue.    I certify that this patients inpatient psychiatric hospital services furnished since the previous certification were, and continue to be, required for treatment that could reasonably be expected to improve the patient's condition, or for diagnostic study, and that the patient continues to need, on a daily basis, active treatment furnished directly by or requiring the supervision of inpatient psychiatric facility personnel. In addition, the hospital records show that services furnished were intensive treatment services, admission or related services, or equivalent services.

## 2019-11-10 NOTE — PROGRESS NOTES
Problem: Altered Thought Process (Adult/Pediatric)  Goal: *STG: Remains safe in hospital  Outcome: Progressing Towards Goal  Note:   Denies suicidal ideation       Problem: Altered Thought Process (Adult/Pediatric)  Goal: *STG: Participates in treatment plan  Outcome: Not Progressing Towards Goal  Note:   Pt. Met in treatment team   continues to have delusional thinking  \"My umbilical chord was attached and I have been president\"   \"I'm a medal of honor [employer] jewel\"     Problem: Altered Thought Process (Adult/Pediatric)  Goal: *STG: Complies with medication therapy  Outcome: Progressing Towards Goal  Note:   Medication compliant   reviewed in treatment team     Problem: Altered Thought Process (Adult/Pediatric)  Goal: *STG: Attends activities and groups  Outcome: Not Progressing Towards Goal  Note:   Isolates in his room     Problem: Altered Thought Process (Adult/Pediatric)  Goal: *STG: Decreased delusional thinking  Outcome: Not Progressing Towards Goal  Note:   Pt. Remains delusional     Problem: Altered Thought Process (Adult/Pediatric)  Goal: Interventions  Outcome: Not Progressing Towards Goal  Note:   Will continue to monitor on 15 min.  Checks for safety assess   thought process  medication compliance  effectiveness  encourage groups     Blocked Bed Documentation:    Room number: 726  Type: Behavior  Rationale: poor hygeine  Anticipated duration:  Assess every shift  Additional comments: refusing to shower  malodorous

## 2019-11-10 NOTE — PROGRESS NOTES
Problem: Altered Thought Process (Adult/Pediatric)  Goal: *STG: Remains safe in hospital  Outcome: Progressing Towards Goal     Received pt in bed resting. Pt appears to be in no distress. Respirations even and unlabored. Continuing to monitor pt with q15 min safety rounds.

## 2019-11-10 NOTE — PROGRESS NOTES
Problem: Altered Thought Process (Adult/Pediatric)  Goal: *STG: Participates in treatment plan  Outcome: Progressing Towards Goal  Variance Patient slowly responding  Pt is guarded and spends much of the evening in his room. Pt greets staff politely, but offers no personal disclosures. Hygiene is poor but pt declines a shower. He is compliant with medication.

## 2019-11-10 NOTE — GROUP NOTE
[facility]  GROUP DOCUMENTATION INDIVIDUAL Group Therapy Note Date: November 9 Group Start Time: 2000 Group End Time: 2030 Group Topic: Reflection/Relaxation [address] [place] [address] Yessenia Hampton 
 
[facility] GROUP DOCUMENTATION GROUP Group Therapy Note Attendees: 7/8 Attendance: Attended Patient's Goal:  Daily progress Interventions/techniques: Informed Follows Directions: Followed directions Interactions: Interacted appropriately Mental Status: Calm Behavior/appearance: Attentive Goals Achieved: Able to listen to others Additional Notes:   Movie evening with peers Yohannes Batres

## 2019-11-10 NOTE — PROGRESS NOTES
Problem: Altered Thought Process (Adult/Pediatric)  Goal: *STG: Participates in treatment plan  Outcome: Progressing Towards Goal  Variance Patient slowly responding  Pt has complied with oral scheduled/court ordered medication, but has declined a shower, despite being malodorous.

## 2019-11-11 PROCEDURE — 74011250637 HC RX REV CODE- 250/637: Performed by: PSYCHIATRY & NEUROLOGY

## 2019-11-11 PROCEDURE — 74011250637 HC RX REV CODE- 250/637: Performed by: NURSE PRACTITIONER

## 2019-11-11 PROCEDURE — 65220000003 HC RM SEMIPRIVATE PSYCH

## 2019-11-11 RX ADMIN — RISPERIDONE 2 MG: 1 TABLET ORAL at 08:38

## 2019-11-11 RX ADMIN — DIVALPROEX SODIUM 1000 MG: 500 TABLET, EXTENDED RELEASE ORAL at 21:08

## 2019-11-11 RX ADMIN — RISPERIDONE 2 MG: 1 TABLET ORAL at 21:08

## 2019-11-11 RX ADMIN — TRAZODONE HYDROCHLORIDE 50 MG: 50 TABLET ORAL at 21:08

## 2019-11-11 NOTE — PROGRESS NOTES
Problem: Altered Thought Process (Adult/Pediatric)  Goal: *STG: Participates in treatment plan  Outcome: Progressing Towards Goal  Variance Patient slowly responding  Pt continues to express delusional thoughts. Hygiene is poor and he declines to shower. Pt is compliant with scheduled, court ordered meds.

## 2019-11-11 NOTE — BH NOTES
Blocked Bed Documentation:    Room number: 726/02  Type: Behavior  Rationale: Poor Self-Control and poor hygiene, patient refusing to perform daily ADLs  Anticipated duration: hospital duration or Tx team decision

## 2019-11-11 NOTE — GROUP NOTE
[facility] GROUP DOCUMENTATION INDIVIDUAL Group Therapy Note Date: November 10 Group Start Time: 0800 Group End Time: 0830 Group Topic: Reflection/Relaxation Rodney Cuadra [facility] GROUP DOCUMENTATION GROUP Group Therapy Note Attendees:  
 
  
 
Attendance: Attended Patient's Goal: Interventions/techniques: Informed Follows Directions: Followed directions Interactions: Interacted appropriately Mental Status: Calm Behavior/appearance: Cooperative Goals Achieved: Able to engage in interactions Additional Notes:   
 
Gabbie Vaz

## 2019-11-11 NOTE — PROGRESS NOTES
Problem: Altered Thought Process (Adult/Pediatric)  Goal: *STG: Participates in treatment plan  Note:   Med and meal compliant. Withdrawn. Isolative. Mood labile. Denies SI/HI  Goal: *STG: Complies with medication therapy  Note:   Compliant  Goal: Interventions  Note:   Medication management, Q 15 min safety rounds. Group therapy.

## 2019-11-11 NOTE — BH NOTES
GROUP THERAPY PROGRESS NOTE    Tarik Pizarro did not participate in an Acute Unit Process Group, with a focus on identifying feelings, planning for the rest of the day, and developing coping skills.

## 2019-11-11 NOTE — PROGRESS NOTES
PSYCHIATRIC PROGRESS NOTE       Patient: Darlene Preciado MRN: [id number]  SSN: [id number]    YOB: 1985  Age: 27 y.o. Sex: male      Admit Date: 11/7/2019    LOS: 4 days       Chief Complaint:  I am waiting for [facility]. Interval History:  Darlene Preciado is doing poorly. Delusions of grandeur very evident. States he needs to go home soon since President Con[employer] De La Torre and [facility] are going to his house. He also kept making references to [employer]. \" He invented the [employer]. He is very disheveled and malodorous, hasn't taken a shower, states he will wait until he goes home to bathe. He is psychotic but has not shown any behavioral issues in the unit. Compliant with his meds and denies side effects. Denies si or hi. Past Medical History:  Past Medical History:   Diagnosis Date    Marijuana use 11/8/2019    Psychiatric disorder          ALLERGIES:(reviewed/updated 11/11/2019)  No Known Allergies    Laboratory report:  Lab Results   Component Value Date/Time    WBC 11.8 (H) 11/08/2019 08:13 PM    Hemoglobin (POC) 16.7 09/09/2009 05:35 PM    HGB 14.4 11/08/2019 08:13 PM    Hematocrit (POC) 49 09/09/2009 05:35 PM    HCT 42.4 11/08/2019 08:13 PM    PLATELET 785 89/51/8856 08:13 PM    MCV 86.7 11/08/2019 08:13 PM      Lab Results   Component Value Date/Time    Sodium 142 11/07/2019 12:05 PM    Potassium 4.1 11/07/2019 12:05 PM    Chloride 104 11/07/2019 12:05 PM    CO2 32 11/07/2019 12:05 PM    Anion gap 6 11/07/2019 12:05 PM    Glucose 88 11/08/2019 05:49 AM    BUN 10 11/07/2019 12:05 PM    Creatinine 1.03 11/07/2019 12:05 PM    BUN/Creatinine ratio 10 (L) 11/07/2019 12:05 PM    GFR est AA >60 11/07/2019 12:05 PM    GFR est non-AA >60 11/07/2019 12:05 PM    Calcium 9.1 11/07/2019 12:05 PM    Bilirubin, total 0.5 11/07/2019 12:05 PM    AST (SGOT) 18 11/07/2019 12:05 PM    Alk.  phosphatase 77 11/07/2019 12:05 PM    Protein, total 6.7 11/07/2019 12:05 PM    Albumin 4.1 11/07/2019 12:05 PM    Globulin 2.6 11/07/2019 12:05 PM    A-G Ratio 1.6 11/07/2019 12:05 PM    ALT (SGPT) 22 11/07/2019 12:05 PM      Vitals:    11/10/19 1139 11/10/19 1558 11/10/19 2052 11/11/19 0742   BP: 121/76 120/71 (!) 141/96 (!) 135/92   Pulse: 82 95 87 98   Resp: 16 18 16 16   Temp: 97.6 °F (36.4 °C) 99 °F (37.2 °C) 98.1 °F (36.7 °C) 97.8 °F (36.6 °C)   SpO2: 96% 98%  97%   Weight:       Height:           No results found for: VALF2, VALAC, VALP, VALPR, DS6, CRBAM, CRBAMP, CARB2, XCRBAM  No results found for: LITHM    Vital Signs  Patient Vitals for the past 24 hrs:   Temp Pulse Resp BP SpO2   11/11/19 0742 97.8 °F (36.6 °C) 98 16 (!) 135/92 97 %   11/10/19 2052 98.1 °F (36.7 °C) 87 16 (!) 141/96 --   11/10/19 1558 99 °F (37.2 °C) 95 18 120/71 98 %     Wt Readings from Last 3 Encounters:   11/10/19 69.7 kg (153 lb 9.6 oz)   11/07/19 72.7 kg (160 lb 4.4 oz)     Temp Readings from Last 3 Encounters:   11/11/19 97.8 °F (36.6 °C)   11/07/19 97.8 °F (36.6 °C)     BP Readings from Last 3 Encounters:   11/11/19 (!) 135/92   11/07/19 138/86     Pulse Readings from Last 3 Encounters:   11/11/19 98   11/07/19 61       Radiology (reviewed/updated 11/11/2019)  No results found. Side Effects: (reviewed/updated 11/11/2019)  None reported or admitted to. Review of Systems: (reviewed/updated 11/11/2019)  Appetite: good  Sleep: good   All other Review of Systems: delusional    Mental Status Exam:  Eye contact: Good eye contact  Psychomotor activity: Relaxed   Speech is pressured  Thought process: loose disorganized  Mood is \"ok\"  Affect: Constricted  Perception: No avh  Thought content: grandiose  Suicidal ideation: No si  Homicidal ideation: No hi  Insight/judgment: Poor  Cognition is grossly intact      Physical Exam:  Musculoskeletal system: steady gait  Tremor not present  Cog wheeling not present      Assessment and Plan:  Shayne Corona meets criteria for a diagnosis of Schizophrenia, chronic on acute exacerbation. Continue current medications as prescribed. Va level in am.  We will closely monitor for safety. We will encourage reality orientation. Disposition planning to continue. I certify that this patients inpatient psychiatric hospital services furnished since the previous certification were, and continue to be, required for treatment that could reasonably be expected to improve the patient's condition, or for diagnostic study, and that the patient continues to need, on a daily basis, active treatment furnished directly by or requiring the supervision of inpatient psychiatric facility personnel. In addition, the hospital records show that services furnished were intensive treatment services, admission or related services, or equivalent services.       Signed:  Salima Perry NP  11/11/2019

## 2019-11-11 NOTE — INTERDISCIPLINARY ROUNDS
Behavioral Health Interdisciplinary Rounds     Patient Name: Jaclyn Alfred  Age: 27 y.o. Room/Bed:  726/  Primary Diagnosis: <principal problem not specified>   Admission Status: Involuntary Commitment and Forced Medication Order     Readmission within 30 days: no  Power of  in place: no  Patient requires a blocked bed: yes          Reason for blocked bed: Impulsive    VTE Prophylaxis: No    Mobility needs/Fall risk: no  Flu Vaccine : no   Nutritional Plan: no  Consults:          Labs/Testing due today?: no    Sleep hours:  6     Participation in Care/Groups:  yes  Medication Compliant?: Yes  PRNS (last 24 hours): None    Restraints (last 24 hours):  no     CIWA (range last 24 hours):     COWS (range last 24 hours):      Alcohol screening (AUDIT) completed -   AUDIT Score: 0     If applicable, date SBIRT discussed in treatment team AND documented:   AUDIT Screen Score: AUDIT Score: 0    Tobacco - patient is a smoker: Have You Used Tobacco in the Past 30 Days: No  Illegal Drugs use: Have You Used Any Illegal Substances Over the Past 12 Months: Yes    24 hour chart check complete: yes     Patient goal(s) for today:   Treatment team focus/goals: Plan to titrate his medications. Progress note : Still delusional.  Still not bathing ( states he will when he gets home )     LOS:  4  Expected LOS: TBD    Financial concerns/prescription coverage:  Medicaid   Family contact: mother - SW called and left a message for his mother.     Family requesting physician contact today:    Discharge plan: he will return home with his mother   Access to weapons :  no     Outpatient provider(s): refer to [facility]   Patient's preferred phone number for follow up call :     Participating treatment team members: Kush Tucker RN

## 2019-11-11 NOTE — PROGRESS NOTES
Problem: Patient Education: Go to Patient Education Activity  Goal: Patient/Family Education  Outcome: Resolved/Met  Pt does not wish to participate in a smoking cessation program.

## 2019-11-12 LAB — VALPROATE SERPL-MCNC: 79 UG/ML (ref 50–100)

## 2019-11-12 PROCEDURE — 74011250637 HC RX REV CODE- 250/637: Performed by: NURSE PRACTITIONER

## 2019-11-12 PROCEDURE — 65220000003 HC RM SEMIPRIVATE PSYCH

## 2019-11-12 PROCEDURE — 36415 COLL VENOUS BLD VENIPUNCTURE: CPT

## 2019-11-12 PROCEDURE — 80164 ASSAY DIPROPYLACETIC ACD TOT: CPT

## 2019-11-12 RX ADMIN — RISPERIDONE 2 MG: 1 TABLET ORAL at 07:58

## 2019-11-12 RX ADMIN — DIVALPROEX SODIUM 1000 MG: 500 TABLET, EXTENDED RELEASE ORAL at 20:51

## 2019-11-12 RX ADMIN — RISPERIDONE 2 MG: 1 TABLET ORAL at 20:51

## 2019-11-12 NOTE — PROGRESS NOTES
PSYCHIATRIC PROGRESS NOTE       Patient: Ash Murray MRN: [id number]  SSN: [id number]    YOB: 1985  Age: 27 y.o. Sex: male      Admit Date: 11/7/2019    LOS: 5 days       Chief Complaint:  I took a shower. Interval History:  Ash Murray is isolative to his room, psychosis is prominent, grandiose, and hygiene is very poor. He is insisting that he took a shower but nursing reports otherwise. He is asking for us to contact the [facility] to check if marina will visit him. 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 Denies si or hi. States his mother will come and visit this afternoon. Va level 79. He is compliant with his meds and denies side effects. Past Medical History:  Past Medical History:   Diagnosis Date    Marijuana use 11/8/2019    Psychiatric disorder          ALLERGIES:(reviewed/updated 11/12/2019)  No Known Allergies    Laboratory report:  Lab Results   Component Value Date/Time    WBC 11.8 (H) 11/08/2019 08:13 PM    Hemoglobin (POC) 16.7 09/09/2009 05:35 PM    HGB 14.4 11/08/2019 08:13 PM    Hematocrit (POC) 49 09/09/2009 05:35 PM    HCT 42.4 11/08/2019 08:13 PM    PLATELET 243 25/35/7597 08:13 PM    MCV 86.7 11/08/2019 08:13 PM      Lab Results   Component Value Date/Time    Sodium 142 11/07/2019 12:05 PM    Potassium 4.1 11/07/2019 12:05 PM    Chloride 104 11/07/2019 12:05 PM    CO2 32 11/07/2019 12:05 PM    Anion gap 6 11/07/2019 12:05 PM    Glucose 88 11/08/2019 05:49 AM    BUN 10 11/07/2019 12:05 PM    Creatinine 1.03 11/07/2019 12:05 PM    BUN/Creatinine ratio 10 (L) 11/07/2019 12:05 PM    GFR est AA >60 11/07/2019 12:05 PM    GFR est non-AA >60 11/07/2019 12:05 PM    Calcium 9.1 11/07/2019 12:05 PM    Bilirubin, total 0.5 11/07/2019 12:05 PM    AST (SGOT) 18 11/07/2019 12:05 PM    Alk.  phosphatase 77 11/07/2019 12:05 PM    Protein, total 6.7 11/07/2019 12:05 PM    Albumin 4.1 11/07/2019 12:05 PM    Globulin 2.6 11/07/2019 12:05 PM    A-G Ratio 1.6 11/07/2019 12:05 PM    ALT (SGPT) 22 11/07/2019 12:05 PM      Vitals:    11/11/19 0742 11/11/19 1626 11/11/19 1942 11/12/19 0758   BP: (!) 135/92 127/89 125/81 130/87   Pulse: 98 (!) 109 97 (!) 110   Resp: 16 18 20 18   Temp: 97.8 °F (36.6 °C) 98.1 °F (36.7 °C) 98.5 °F (36.9 °C) 97.8 °F (36.6 °C)   SpO2: 97% 98% 99% 98%   Weight:       Height:           Lab Results   Component Value Date/Time    Valproic acid 79 11/12/2019 06:00 AM     No results found for: LITHM    Vital Signs  Patient Vitals for the past 24 hrs:   Temp Pulse Resp BP SpO2   11/12/19 0758 97.8 °F (36.6 °C) (!) 110 18 130/87 98 %   11/11/19 1942 98.5 °F (36.9 °C) 97 20 125/81 99 %   11/11/19 1626 98.1 °F (36.7 °C) (!) 109 18 127/89 98 %     Wt Readings from Last 3 Encounters:   11/10/19 69.7 kg (153 lb 9.6 oz)   11/07/19 72.7 kg (160 lb 4.4 oz)     Temp Readings from Last 3 Encounters:   11/12/19 97.8 °F (36.6 °C)   11/07/19 97.8 °F (36.6 °C)     BP Readings from Last 3 Encounters:   11/12/19 130/87   11/07/19 138/86     Pulse Readings from Last 3 Encounters:   11/12/19 (!) 110   11/07/19 61       Radiology (reviewed/updated 11/12/2019)  No results found. Side Effects: (reviewed/updated 11/12/2019)  None reported or admitted to. Review of Systems: (reviewed/updated 11/12/2019)  Appetite: good  Sleep: good   All other Review of Systems: delusional    Mental Status Exam:  Eye contact: Good eye contact  Psychomotor activity: Relaxed   Speech is pressured  Thought process: loose disorganized  Mood is \"ok\"  Affect: Constricted  Perception: No avh  Thought content: grandiose  Suicidal ideation: No si  Homicidal ideation: No hi  Insight/judgment: Poor  Cognition is grossly intact      Physical Exam:  Musculoskeletal system: steady gait  Tremor not present  Cog wheeling not present      Assessment and Plan:  Loan Sesay meets criteria for a diagnosis of Schizophrenia, chronic on acute exacerbation.      Continue current medications as prescribed. We will closely monitor for safety. We will encourage reality orientation. Disposition planning to continue. I certify that this patients inpatient psychiatric hospital services furnished since the previous certification were, and continue to be, required for treatment that could reasonably be expected to improve the patient's condition, or for diagnostic study, and that the patient continues to need, on a daily basis, active treatment furnished directly by or requiring the supervision of inpatient psychiatric facility personnel. In addition, the hospital records show that services furnished were intensive treatment services, admission or related services, or equivalent services.       Signed:  Jose Carlos Barnes NP  11/12/2019

## 2019-11-12 NOTE — PROGRESS NOTES
Problem: Altered Thought Process (Adult/Pediatric)  Goal: *STG: Participates in treatment plan  Outcome: Progressing Towards Goal  Note:   Met in treatment team  poor hygiene    malodorous     isolates in his room except for meals     Problem: Altered Thought Process (Adult/Pediatric)  Goal: *STG: Decreased delusional thinking  Outcome: Not Progressing Towards Goal  Note:   Pt. 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 Checks for safety  assess thought process  medication compliance  effectiveness   encourage unit participation     Blocked Bed Documentation:    Room number: 726  Type: Behavior  Rationale: poor hygiene  Anticipated duration: assess every shift   Additional comments  Pt.  Malodorous  Refusing to shower

## 2019-11-12 NOTE — BH NOTES
GROUP THERAPY PROGRESS NOTE    The patient Tarik Hanson is participating in [employer]. Group time: 30 minutes    Personal goal for participation: to orient the patient to the unit.     Goal orientation: successful adoption of unit rules    Group therapy participation: active    Therapeutic interventions reviewed and discussed: Yes    Impression of participation:     Tristian Michael  11/12/2019 1:39 PM

## 2019-11-12 NOTE — PROGRESS NOTES
Laboratory Monitoring for Divalproex/Valproic Acid: This patient is currently prescribed the following medication(s):   Current Facility-Administered Medications   Medication Dose Route Frequency    risperiDONE (RisperDAL) tablet 2 mg  2 mg Oral BID    Or    haloperidol lactate (HALDOL) injection 5 mg  5 mg IntraMUSCular BID    divalproex ER (DEPAKOTE ER) 24 hour tablet 1,000 mg  1,000 mg Oral QHS     The following labs have been completed for monitoring of valproic acid:    Valproic Acid Serum Concentration  Lab Results   Component Value Date/Time    Valproic acid 79 11/12/2019 06:00 AM       Hepatic Function  Lab Results   Component Value Date/Time    Bilirubin, total 0.5 11/07/2019 12:05 PM    Protein, total 6.7 11/07/2019 12:05 PM    Albumin 4.1 11/07/2019 12:05 PM    Globulin 2.6 11/07/2019 12:05 PM    A-G Ratio 1.6 11/07/2019 12:05 PM    ALT (SGPT) 22 11/07/2019 12:05 PM    Alk. phosphatase 77 11/07/2019 12:05 PM     Hematology  Lab Results   Component Value Date/Time    WBC 11.8 (H) 11/08/2019 08:13 PM    RBC 4.89 11/08/2019 08:13 PM    HGB 14.4 11/08/2019 08:13 PM    HCT 42.4 11/08/2019 08:13 PM    MCV 86.7 11/08/2019 08:13 PM    MCH 29.4 11/08/2019 08:13 PM    MCHC 34.0 11/08/2019 08:13 PM    RDW 11.9 11/08/2019 08:13 PM    PLATELET 616 06/73/7776 08:13 PM     Assessment/Plan:  A valproic acid level was drawn on 11/12 @ 0600 and found to be 79 mcg/mL. This level was drawn after 4 nights of therapy on 1000mg ER. This level is NOT reflective of a \"true\" trough as it was drawn ~9 hours post ER dose. Levels drawn <16 hours post ER dose can be as much as 25% higher than true trough. Based on this level, recommend increasing dose to 1500mg ER QHS.      Logan Buerger, PharmD, BCPP, [facility], [address] [place] [address]

## 2019-11-12 NOTE — INTERDISCIPLINARY ROUNDS
Behavioral Health Interdisciplinary Rounds     Patient Name: Shayne Corona  Age: 27 y.o. Room/Bed:  726/  Primary Diagnosis: <principal problem not specified>   Admission Status: Involuntary Commitment and Forced Medication Order     Readmission within 30 days: no  Power of  in place: no  Patient requires a blocked bed: yes          Reason for blocked bed: Impulsive    VTE Prophylaxis: No    Mobility needs/Fall risk: no  Flu Vaccine : no   Nutritional Plan: no  Consults:          Labs/Testing due today?: yes    Sleep hours:  6     Participation in Care/Groups:  yes  Medication Compliant?: Yes  PRNS (last 24 hours): Sleep Aid    Restraints (last 24 hours):  no     CIWA (range last 24 hours):     COWS (range last 24 hours):      Alcohol screening (AUDIT) completed -   AUDIT Score: 0     If applicable, date SBIRT discussed in treatment team AND documented:   AUDIT Screen Score: AUDIT Score: 0    Tobacco - patient is a smoker: Have You Used Tobacco in the Past 30 Days: No  Illegal Drugs use: Have You Used Any Illegal Substances Over the Past 12 Months: Yes    24 hour chart check complete: yes     Patient goal(s) for today:   Treatment team focus/goals: Plan to titrate his medications. Plan to try to reach his mother. Progress note : He remains very delusional and guarded. Refusing to take a shower. LOS:  5  Expected LOS: TBD  Financial concerns/prescription coverage:     Family contact: no    Family requesting physician contact today:    Discharge plan:He will return home.     Access to weapons :  no     Outpatient provider(s): refer to [facility]   Patient's preferred phone number for follow up call :     Participating treatment team members: Fabiola Kowalski, [facility], KIM Ewing

## 2019-11-12 NOTE — PROGRESS NOTES
Problem: Altered Thought Process (Adult/Pediatric)  Goal: *STG: Remains safe in hospital  Outcome: Progressing Towards Goal     Received pt awake in bed. Pt voiced no complaints or concerns and went to sleep shortly after. Pt appears to be in no distress. Respirations even and unlabored. Continuing to monitor pt with q15 min safety rounds.

## 2019-11-12 NOTE — BH NOTES
GROUP THERAPY PROGRESS NOTE    Esme Gaming did not participate in the Acute Unit's Process Group, with a focus identifying feelings,  planning for the day, and describing ones situation with a pictogram.

## 2019-11-13 PROCEDURE — 74011250637 HC RX REV CODE- 250/637: Performed by: NURSE PRACTITIONER

## 2019-11-13 PROCEDURE — 65220000003 HC RM SEMIPRIVATE PSYCH

## 2019-11-13 RX ORDER — DIVALPROEX SODIUM 500 MG/1
1500 TABLET, EXTENDED RELEASE ORAL
Status: DISCONTINUED | OUTPATIENT
Start: 2019-11-13 | End: 2019-11-16

## 2019-11-13 RX ORDER — RISPERIDONE 3 MG/1
3 TABLET, FILM COATED ORAL 2 TIMES DAILY
Status: DISCONTINUED | OUTPATIENT
Start: 2019-11-13 | End: 2019-11-16

## 2019-11-13 RX ORDER — HALOPERIDOL 5 MG/ML
5 INJECTION INTRAMUSCULAR 2 TIMES DAILY
Status: DISCONTINUED | OUTPATIENT
Start: 2019-11-13 | End: 2019-11-18

## 2019-11-13 RX ADMIN — RISPERIDONE 3 MG: 3 TABLET ORAL at 20:30

## 2019-11-13 RX ADMIN — DIVALPROEX SODIUM 1500 MG: 500 TABLET, EXTENDED RELEASE ORAL at 20:29

## 2019-11-13 RX ADMIN — RISPERIDONE 2 MG: 1 TABLET ORAL at 08:24

## 2019-11-13 NOTE — BH NOTES
GROUP THERAPY PROGRESS NOTE    Marleneduy Margaux did not participate in the afternoon Process Group on the Acute Unit with a focus identifying feelings, planning for the day, and learning more about DBT concepts on \"Emotion Regulation. \"

## 2019-11-13 NOTE — PROGRESS NOTES
PSYCHIATRIC PROGRESS NOTE       Patient: Jaclyn Alfred MRN: [id number]  SSN: [id number]    YOB: 1985  Age: 27 y.o. Sex: male      Admit Date: 11/7/2019    LOS: 6 days       Chief Complaint:  I need to go to the meeting. Interval History:  Jaclyn Alfred remains profoundly psychotic, he is paranoid, delusional, and grandiose. He has a very putrid smell. , states he needs to go to the meeting first before he takes a shower. Disorganized thought process. Hyperreligious. Making reference about Vj. States Martians are falling in the atmosphere, a lot of psych intelligence going on, states he is the jewel who can help human beings. States he cant even brush his teeth. Believes his mother is in danger and shoudln't move from her house. Compliant with meds and denies side effects. Past Medical History:  Past Medical History:   Diagnosis Date    Marijuana use 11/8/2019    Psychiatric disorder          ALLERGIES:(reviewed/updated 11/13/2019)  No Known Allergies    Laboratory report:  Lab Results   Component Value Date/Time    WBC 11.8 (H) 11/08/2019 08:13 PM    Hemoglobin (POC) 16.7 09/09/2009 05:35 PM    HGB 14.4 11/08/2019 08:13 PM    Hematocrit (POC) 49 09/09/2009 05:35 PM    HCT 42.4 11/08/2019 08:13 PM    PLATELET 334 17/64/5281 08:13 PM    MCV 86.7 11/08/2019 08:13 PM      Lab Results   Component Value Date/Time    Sodium 142 11/07/2019 12:05 PM    Potassium 4.1 11/07/2019 12:05 PM    Chloride 104 11/07/2019 12:05 PM    CO2 32 11/07/2019 12:05 PM    Anion gap 6 11/07/2019 12:05 PM    Glucose 88 11/08/2019 05:49 AM    BUN 10 11/07/2019 12:05 PM    Creatinine 1.03 11/07/2019 12:05 PM    BUN/Creatinine ratio 10 (L) 11/07/2019 12:05 PM    GFR est AA >60 11/07/2019 12:05 PM    GFR est non-AA >60 11/07/2019 12:05 PM    Calcium 9.1 11/07/2019 12:05 PM    Bilirubin, total 0.5 11/07/2019 12:05 PM    AST (SGOT) 18 11/07/2019 12:05 PM    Alk.  phosphatase 77 11/07/2019 12:05 PM    Protein, total 6.7 11/07/2019 12:05 PM    Albumin 4.1 11/07/2019 12:05 PM    Globulin 2.6 11/07/2019 12:05 PM    A-G Ratio 1.6 11/07/2019 12:05 PM    ALT (SGPT) 22 11/07/2019 12:05 PM      Vitals:    11/12/19 0758 11/12/19 1635 11/12/19 1936 11/13/19 0749   BP: 130/87 108/51 101/71 135/89   Pulse: (!) 110 (!) 103 95 86   Resp: 18 16 18 16   Temp: 97.8 °F (36.6 °C) 98.3 °F (36.8 °C) 97.3 °F (36.3 °C) 98.1 °F (36.7 °C)   SpO2: 98% 98% 99% 97%   Weight:       Height:           Lab Results   Component Value Date/Time    Valproic acid 79 11/12/2019 06:00 AM     No results found for: LITH    Vital Signs  Patient Vitals for the past 24 hrs:   Temp Pulse Resp BP SpO2   11/13/19 0749 98.1 °F (36.7 °C) 86 16 135/89 97 %   11/12/19 1936 97.3 °F (36.3 °C) 95 18 101/71 99 %   11/12/19 1635 98.3 °F (36.8 °C) (!) 103 16 108/51 98 %     Wt Readings from Last 3 Encounters:   11/10/19 69.7 kg (153 lb 9.6 oz)   11/07/19 72.7 kg (160 lb 4.4 oz)     Temp Readings from Last 3 Encounters:   11/13/19 98.1 °F (36.7 °C)   11/07/19 97.8 °F (36.6 °C)     BP Readings from Last 3 Encounters:   11/13/19 135/89   11/07/19 138/86     Pulse Readings from Last 3 Encounters:   11/13/19 86   11/07/19 61       Radiology (reviewed/updated 11/13/2019)  No results found. Side Effects: (reviewed/updated 11/13/2019)  None reported or admitted to.     Review of Systems: (reviewed/updated 11/13/2019)  Appetite: good  Sleep: good   All other Review of Systems: delusional    Mental Status Exam:  Eye contact: Good eye contact  Psychomotor activity: Relaxed   Speech is pressured  Thought process: loose disorganized  Mood is \"ok\"  Affect: Constricted  Perception: No avh  Thought content: grandiose  Suicidal ideation: No si  Homicidal ideation: No hi  Insight/judgment: Poor  Cognition is grossly intact      Physical Exam:  Musculoskeletal system: steady gait  Tremor not present  Cog wheeling not present      Assessment and Plan:  Guido Santillan meets criteria for a diagnosis of Schizophrenia, chronic on acute exacerbation. Increase risperdal to 3mg bid. Increase depakote to 1500mg. Continue rest of medications as prescribed. We will closely monitor for safety. We will encourage reality orientation. Disposition planning to continue. I certify that this patients inpatient psychiatric hospital services furnished since the previous certification were, and continue to be, required for treatment that could reasonably be expected to improve the patient's condition, or for diagnostic study, and that the patient continues to need, on a daily basis, active treatment furnished directly by or requiring the supervision of inpatient psychiatric facility personnel. In addition, the hospital records show that services furnished were intensive treatment services, admission or related services, or equivalent services.       Signed:  Eileen Shirley NP  11/13/2019

## 2019-11-13 NOTE — PROGRESS NOTES
PSYCHIATRIC PROGRESS NOTE       Patient: Elías Johnson MRN: [id number]  SSN: [id number]    YOB: 1985  Age: 27 y.o. Sex: male      Admit Date: 11/7/2019    LOS: 5 days       Chief Complaint:  I am doing fine    Interval History:  Tarik Hanson is isolative to his room, psychosis is prominent, grandiose, and hygiene is very poor. States he Is a  and also the president of [employer] He endorsed being the former president and he can go to the Adm[employer] anytime. Denies SI/HI/AVH but observed responding to internal stimuli. He is compliant with his meds and denies side effects. Past Medical History:  Past Medical History:   Diagnosis Date    Marijuana use 11/8/2019    Psychiatric disorder          ALLERGIES:(reviewed/updated 11/12/2019)  No Known Allergies    Laboratory report:  Lab Results   Component Value Date/Time    WBC 11.8 (H) 11/08/2019 08:13 PM    Hemoglobin (POC) 16.7 09/09/2009 05:35 PM    HGB 14.4 11/08/2019 08:13 PM    Hematocrit (POC) 49 09/09/2009 05:35 PM    HCT 42.4 11/08/2019 08:13 PM    PLATELET 101 10/52/0761 08:13 PM    MCV 86.7 11/08/2019 08:13 PM      Lab Results   Component Value Date/Time    Sodium 142 11/07/2019 12:05 PM    Potassium 4.1 11/07/2019 12:05 PM    Chloride 104 11/07/2019 12:05 PM    CO2 32 11/07/2019 12:05 PM    Anion gap 6 11/07/2019 12:05 PM    Glucose 88 11/08/2019 05:49 AM    BUN 10 11/07/2019 12:05 PM    Creatinine 1.03 11/07/2019 12:05 PM    BUN/Creatinine ratio 10 (L) 11/07/2019 12:05 PM    GFR est AA >60 11/07/2019 12:05 PM    GFR est non-AA >60 11/07/2019 12:05 PM    Calcium 9.1 11/07/2019 12:05 PM    Bilirubin, total 0.5 11/07/2019 12:05 PM    AST (SGOT) 18 11/07/2019 12:05 PM    Alk.  phosphatase 77 11/07/2019 12:05 PM    Protein, total 6.7 11/07/2019 12:05 PM    Albumin 4.1 11/07/2019 12:05 PM    Globulin 2.6 11/07/2019 12:05 PM    A-G Ratio 1.6 11/07/2019 12:05 PM    ALT (SGPT) 22 11/07/2019 12:05 PM      Vitals:    11/11/19 1942 11/12/19 0758 11/12/19 1635 11/12/19 1936   BP: 125/81 130/87 108/51 101/71   Pulse: 97 (!) 110 (!) 103 95   Resp: 20 18 16 18   Temp: 98.5 °F (36.9 °C) 97.8 °F (36.6 °C) 98.3 °F (36.8 °C) 97.3 °F (36.3 °C)   SpO2: 99% 98% 98% 99%   Weight:       Height:           Lab Results   Component Value Date/Time    Valproic acid 79 11/12/2019 06:00 AM     No results found for: LITHM    Vital Signs  Patient Vitals for the past 24 hrs:   Temp Pulse Resp BP SpO2   11/12/19 1936 97.3 °F (36.3 °C) 95 18 101/71 99 %   11/12/19 1635 98.3 °F (36.8 °C) (!) 103 16 108/51 98 %   11/12/19 0758 97.8 °F (36.6 °C) (!) 110 18 130/87 98 %     Wt Readings from Last 3 Encounters:   11/10/19 69.7 kg (153 lb 9.6 oz)   11/07/19 72.7 kg (160 lb 4.4 oz)     Temp Readings from Last 3 Encounters:   11/12/19 97.3 °F (36.3 °C)   11/07/19 97.8 °F (36.6 °C)     BP Readings from Last 3 Encounters:   11/12/19 101/71   11/07/19 138/86     Pulse Readings from Last 3 Encounters:   11/12/19 95   11/07/19 61       Radiology (reviewed/updated 11/09/2019)  No results found. Side Effects: (reviewed/updated 11/09/2019)  None reported or admitted to. Review of Systems: (reviewed/updated 11/09/2019)  Appetite: good  Sleep: good   All other Review of Systems: delusional    Mental Status Exam:  Eye contact: Good eye contact  Psychomotor activity: Relaxed   Speech is pressured  Thought process: loose disorganized  Mood is \"ok\"  Affect: Constricted  Perception: No avh  Thought content: grandiose  Suicidal ideation: No si  Homicidal ideation: No hi  Insight/judgment: Poor  Cognition is grossly intact      Physical Exam:  Musculoskeletal system: steady gait  Tremor not present  Cog wheeling not present      Assessment and Plan:  Elías Johnson meets criteria for a diagnosis of Schizophrenia, chronic on acute exacerbation. Continue current medications as prescribed. We will closely monitor for safety. We will encourage reality orientation.   Disposition planning to continue. I certify that this patients inpatient psychiatric hospital services furnished since the previous certification were, and continue to be, required for treatment that could reasonably be expected to improve the patient's condition, or for diagnostic study, and that the patient continues to need, on a daily basis, active treatment furnished directly by or requiring the supervision of inpatient psychiatric facility personnel. In addition, the hospital records show that services furnished were intensive treatment services, admission or related services, or equivalent services.       Signed:  Yanet Curran NP  11/09/2019

## 2019-11-13 NOTE — PROGRESS NOTES
Problem: Altered Thought Process (Adult/Pediatric)  Goal: *STG: Participates in treatment plan  Outcome: Progressing Towards Goal  Variance Patient slowly responding  Pt complies with oral court ordered/scheduled meds, orally. Pt continues to be malodorous and refuses to shower.

## 2019-11-13 NOTE — PROGRESS NOTES
Problem: Altered Thought Process (Adult/Pediatric)  Goal: *STG: Participates in treatment plan  Outcome: Not Progressing Towards Goal  Note:   Pt. Isolates in his room  out on unit for breakfast    preoccupied       Problem: Altered Thought Process (Adult/Pediatric)  Goal: *STG: Complies with medication therapy  Outcome: Progressing Towards Goal  Note:   Pt. Remains delusional   continues to state he was the president       poor hygiene  states he will shower but refuses to comply       Problem: Altered Thought Process (Adult/Pediatric)  Goal: Interventions  Outcome: Not Progressing Towards Goal  Note:   Will continue to monitor on 15 min.  Checks for safety  assess thought process  medication compliance  effectiveness   encourage groups     Blocked Bed Documentation:    Room number: 726  Type: Behavior  Rationale:  Poor hygiene    malodorous  Anticipated duration: assess every shift  Additional comments:

## 2019-11-13 NOTE — PROGRESS NOTES
Problem: Altered Thought Process (Adult/Pediatric)  Goal: *STG: Participates in treatment plan  Outcome: Progressing Towards Goal  Note:   ACTIVE PARTICIPATION IN TREATMENT TEAM.Stated \"I have psychic intelligence. The Martians are coming  in the atmosphere to destroy  the world. I need to tell all the Presidents in [facility]. I can`t take a shower until I talk to [employer] directly. \"Patients thoughts were disorganized filled with delusions.

## 2019-11-13 NOTE — GROUP NOTE
[facility]  GROUP DOCUMENTATION INDIVIDUAL Group Therapy Note Date: November 12 Group Start Time: 2015 Group End Time: 2030 Group Topic: Reflection/Relaxation [address] [place] [address] Yessenia Hampton 
 
[facility] GROUP DOCUMENTATION GROUP Group Therapy Note Attendees: 3/8 Attendance: Attended Patient's Goal:  Daily goal 
 
Interventions/techniques: Informed Follows Directions: Followed directions Interactions: Interacted appropriately Mental Status: Calm Behavior/appearance: Attentive Goals Achieved: Able to listen to others Additional Notes: Unit rules and daily goal 
 
Ana Guerin

## 2019-11-13 NOTE — INTERDISCIPLINARY ROUNDS
Behavioral Health Interdisciplinary Rounds     Patient Name: Elías Johnson  Age: 27 y.o. Room/Bed:  726/  Primary Diagnosis: <principal problem not specified>   Admission Status: Involuntary Commitment and Forced Medication Order     Readmission within 30 days: no  Power of  in place: no  Patient requires a blocked bed: yes          Reason for blocked bed: Impulsive    VTE Prophylaxis: No    Mobility needs/Fall risk: no  Flu Vaccine : no   Nutritional Plan: no  Consults:          Labs/Testing due today?: no    Sleep hours:  6.5      Participation in Care/Groups:  yes  Medication Compliant?: Yes  PRNS (last 24 hours): None    Restraints (last 24 hours):  no     CIWA (range last 24 hours):     COWS (range last 24 hours):      Alcohol screening (AUDIT) completed -   AUDIT Score: 0     If applicable, date SBIRT discussed in treatment team AND documented:   AUDIT Screen Score: AUDIT Score: 0    Tobacco - patient is a smoker: Have You Used Tobacco in the Past 30 Days: No  Illegal Drugs use: Have You Used Any Illegal Substances Over the Past 12 Months: Yes    24 hour chart check complete: yes     Patient goal(s) for today:  Treatment team focus/goals: Plan to titrate his medications   Progress note : he remains very delusional and guarded. He has been complaint with his medications, but refuses to take a shower or brush his teeth due to delusional thoughts. LOS:  6  Expected LOS: TBD    Financial concerns/prescription coverage:  [employer]ane   Family contact: mother Idalia Murphy - [phone] SW left a message    Family requesting physician contact today:   Discharge plan: he will return home with his mother   Access to weapons :no       Outpatient provider(s): [place] [id number]   Patient's preferred phone number for follow up call :    Participating treatment team members: Brooke Delaney NP -Bhavna Prince RN - Ravindra Pitt, PharmD.

## 2019-11-13 NOTE — PROGRESS NOTES
Blocked Bed Documentation:    Room number: 726/02  Type: Behavior  Rationale: Poor Self-Control, poor hygiene, active delusions  Anticipated duration: hospital duration or Tx team decision      Patient still having active delusions. Problem: Falls - Risk of  Goal: *Absence of Falls  Description  Document Velma Garg Fall Risk and appropriate interventions in the flowsheet.   Outcome: Progressing Towards Goal  Note:   Fall Risk Interventions:  Mobility Interventions: Assess mobility with egress test    Mentation Interventions: Adequate sleep, hydration, pain control    Medication Interventions: Teach patient to arise slowly         History of Falls Interventions: Door open when patient unattended         Problem: Altered Thought Process (Adult/Pediatric)  Goal: *STG: Decreased delusional thinking  Outcome: Not Progressing Towards Goal

## 2019-11-14 PROCEDURE — 74011250637 HC RX REV CODE- 250/637: Performed by: NURSE PRACTITIONER

## 2019-11-14 PROCEDURE — 65220000003 HC RM SEMIPRIVATE PSYCH

## 2019-11-14 RX ADMIN — RISPERIDONE 3 MG: 3 TABLET ORAL at 20:58

## 2019-11-14 RX ADMIN — RISPERIDONE 3 MG: 3 TABLET ORAL at 08:32

## 2019-11-14 RX ADMIN — DIVALPROEX SODIUM 1500 MG: 500 TABLET, EXTENDED RELEASE ORAL at 20:59

## 2019-11-14 NOTE — PROGRESS NOTES
Problem: Discharge Planning  Goal: *Discharge to safe environment  Outcome: Progressing Towards Goal  Note:   He will return home when ready for discharge  He has supportive family   Goal: *Knowledge of medication management  Outcome: Progressing Towards Goal  Note:   He has been complaint with his medications      Problem: Discharge Planning  Goal: *Knowledge of discharge instructions  Outcome: Not Progressing Towards Goal  Note:   He is not able to verbalize discharge instructions

## 2019-11-14 NOTE — PROGRESS NOTES
[facility]  Master Treatment Plan for Elías Johnson    Date Treatment Plan Initiated: 11/14/2019    Treatment Plan Modalities:  Type of Modality Amount  (x minutes) Frequency (x/week) Duration (x days) Name of Responsible Staff   [address] [facility] meetings to encourage peer interactions 15 7 1   I [facility]   Group psychotherapy to assist in building coping skills and internal controls 60 7 1 Edson Paul   Therapeutic activity groups to build coping skills 60 7 1 Edson Paul   Psychoeducation in group setting to address:   Medication education   15 7 1 FRANKLIN Hardy RN   Coping skills      Kaylee Hines RN   Relaxation techniques      [facility]   Symptom management      Staff   Discharge planning   60 2 255 [facility]    60 2 1 [facility] 66   60 1 1 volunteer   Recovery/AA/NA      volunteer   Physician medication management   15 7 1 Dr Beatriz Banks   Family meeting/discharge planning   15 2 1 . Jennifer Lugo 61 and [employer]                                      Problem: Altered Thought Process (Adult/Pediatric)  Goal: *STG: Remains safe in hospital  Outcome: Progressing Towards Goal  Note:   Pt denies any suicidal or homicidal thoughts. Contracts for safety. Remains on Q 15 min safety checks. No agitation or aggression. Goal: *STG: Seeks staff when feelings of anxiety and fear arise  11/14/2019 0917 by Obi Hand RN  Outcome: Not Progressing Towards Goal  Note:   Minimal interaction with staff or peers only exits room for meals otherwise isolative to self. 11/14/2019 0915 by Obi Hand RN  Outcome: Not Progressing Towards Goal  Goal: *STG: Attends activities and groups  Outcome: Not Progressing Towards Goal  Note:   Declines group attendance on frequent basis. Stated \"I don't like that stuff. \"  Goal: *STG: Decreased delusional thinking  Outcome: Not Progressing Towards Goal  Note:   During 1:1 interaction patient stated \"I talk directly to the President and he tells me all the information about space activity and aliens. \"   Goal: *STG: Decreased hallucinations  Note:   Denies AH/VH. Problem: Altered Thought Process (Adult/Pediatric)  Goal: *STG: Complies with medication therapy  Note:   Medication compliant but declined to answer question \"are you having any side effects from medication. \"When approached at a different time he stated \"the medication is ok with me. No proBLEMS.

## 2019-11-14 NOTE — INTERDISCIPLINARY ROUNDS
Behavioral Health Interdisciplinary Rounds     Patient Name: Chinyere Crawford  Age: 27 y.o. Room/Bed:  726/01  Primary Diagnosis: <principal problem not specified>   Admission Status: Involuntary Commitment and Forced Medication Order     Readmission within 30 days: no  Power of  in place: no  Patient requires a blocked bed: yes          Reason for blocked bed: Impulsive    VTE Prophylaxis: No    Mobility needs/Fall risk: no  Flu Vaccine : no   Nutritional Plan: no  Consults:          Labs/Testing due today?: no    Sleep hours:  6.5      Participation in Care/Groups:  yes  Medication Compliant?: Yes  PRNS (last 24 hours): None    Restraints (last 24 hours):  no     CIWA (range last 24 hours):     COWS (range last 24 hours):      Alcohol screening (AUDIT) completed -   AUDIT Score: 0     If applicable, date SBIRT discussed in treatment team AND documented:   AUDIT Screen Score: AUDIT Score: 0  Tobacco - patient is a smoker: Have You Used Tobacco in the Past 30 Days: No  Illegal Drugs use: Have You Used Any Illegal Substances Over the Past 12 Months: Yes    24 hour chart check complete: yes     Patient goal(s) for today:   Treatment team focus/goals: Plan to titrate his medications. Progress note: he did shower yesterday , but did not wash his hair. PAO spoke to his mom. He remains very delusional .       LOS:  7  Expected LOS: TBD    Financial concerns/prescription coverage:  Medicaid   Family contact: PAO spoke to his mom today.       Family requesting physician contact today:    Discharge plan: he will return home when ready for discharge   Access to weapons :no       Outpatient provider(s): refer to  Luciana Zheng recently moved to [facility]   Patient's preferred phone number for follow up call :     Participating treatment team members: Nimisha Tan, [address] -

## 2019-11-14 NOTE — PROGRESS NOTES
PSYCHIATRIC PROGRESS NOTE       Patient: Chinyere Crawford MRN: [id number]  SSN: [id number]    YOB: 1985  Age: 27 y.o. Sex: male      Admit Date: 11/7/2019    LOS: 7 days       Chief Complaint:  I took a shower. Interval History:  Chinyere Crawford reports feeling ok. He took a shower yesterday but did not wet his hair. He is not as unkempt and malodorous as before. 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 Sleeping and eating ok. He is compliant with treatment, including meds and denies adverse effects. No eps from increased of risperdal. Pleasantly psychotic. Past Medical History:  Past Medical History:   Diagnosis Date    Marijuana use 11/8/2019    Psychiatric disorder          ALLERGIES:(reviewed/updated 11/14/2019)  No Known Allergies    Laboratory report:  Lab Results   Component Value Date/Time    WBC 11.8 (H) 11/08/2019 08:13 PM    Hemoglobin (POC) 16.7 09/09/2009 05:35 PM    HGB 14.4 11/08/2019 08:13 PM    Hematocrit (POC) 49 09/09/2009 05:35 PM    HCT 42.4 11/08/2019 08:13 PM    PLATELET 837 56/99/4641 08:13 PM    MCV 86.7 11/08/2019 08:13 PM      Lab Results   Component Value Date/Time    Sodium 142 11/07/2019 12:05 PM    Potassium 4.1 11/07/2019 12:05 PM    Chloride 104 11/07/2019 12:05 PM    CO2 32 11/07/2019 12:05 PM    Anion gap 6 11/07/2019 12:05 PM    Glucose 88 11/08/2019 05:49 AM    BUN 10 11/07/2019 12:05 PM    Creatinine 1.03 11/07/2019 12:05 PM    BUN/Creatinine ratio 10 (L) 11/07/2019 12:05 PM    GFR est AA >60 11/07/2019 12:05 PM    GFR est non-AA >60 11/07/2019 12:05 PM    Calcium 9.1 11/07/2019 12:05 PM    Bilirubin, total 0.5 11/07/2019 12:05 PM    AST (SGOT) 18 11/07/2019 12:05 PM    Alk.  phosphatase 77 11/07/2019 12:05 PM    Protein, total 6.7 11/07/2019 12:05 PM    Albumin 4.1 11/07/2019 12:05 PM    Globulin 2.6 11/07/2019 12:05 PM    A-G Ratio 1.6 11/07/2019 12:05 PM    ALT (SGPT) 22 11/07/2019 12:05 PM      Vitals:    11/12/19 1936 11/13/19 0749 11/13/19 1600 11/14/19 0728   BP: 101/71 135/89 132/84 135/75   Pulse: 95 86 89 90   Resp: 18 16 18 16   Temp: 97.3 °F (36.3 °C) 98.1 °F (36.7 °C) 98.4 °F (36.9 °C) 97.9 °F (36.6 °C)   SpO2: 99% 97% 99% 100%   Weight:       Height:           Lab Results   Component Value Date/Time    Valproic acid 79 11/12/2019 06:00 AM     No results found for: LITHM    Vital Signs  Patient Vitals for the past 24 hrs:   Temp Pulse Resp BP SpO2   11/14/19 0728 97.9 °F (36.6 °C) 90 16 135/75 100 %   11/13/19 1600 98.4 °F (36.9 °C) 89 18 132/84 99 %     Wt Readings from Last 3 Encounters:   11/10/19 69.7 kg (153 lb 9.6 oz)   11/07/19 72.7 kg (160 lb 4.4 oz)     Temp Readings from Last 3 Encounters:   11/14/19 97.9 °F (36.6 °C)   11/07/19 97.8 °F (36.6 °C)     BP Readings from Last 3 Encounters:   11/14/19 135/75   11/07/19 138/86     Pulse Readings from Last 3 Encounters:   11/14/19 90   11/07/19 61       Radiology (reviewed/updated 11/14/2019)  No results found. Side Effects: (reviewed/updated 11/14/2019)  None reported or admitted to. Review of Systems: (reviewed/updated 11/14/2019)  Appetite: good  Sleep: good   All other Review of Systems: delusional    Mental Status Exam:  Eye contact: Good eye contact  Psychomotor activity: Relaxed   Speech is pressured  Thought process: loose disorganized  Mood is \"ok\"  Affect: Constricted  Perception: No avh  Thought content: grandiose  Suicidal ideation: No si  Homicidal ideation: No hi  Insight/judgment: Poor  Cognition is grossly intact      Physical Exam:  Musculoskeletal system: steady gait  Tremor not present  Cog wheeling not present      Assessment and Plan:  Darlene Preciado meets criteria for a diagnosis of Schizophrenia, chronic on acute exacerbation. Continue current medications as prescribed. We will closely monitor for safety. We will encourage reality orientation. Disposition planning to continue.        I certify that this patients inpatient psychiatric hospital services furnished since the previous certification were, and continue to be, required for treatment that could reasonably be expected to improve the patient's condition, or for diagnostic study, and that the patient continues to need, on a daily basis, active treatment furnished directly by or requiring the supervision of inpatient psychiatric facility personnel. In addition, the hospital records show that services furnished were intensive treatment services, admission or related services, or equivalent services.       Signed:  Sunitha Huff NP  11/14/2019

## 2019-11-14 NOTE — PROGRESS NOTES
Problem: Falls - Risk of  Goal: *Absence of Falls  Description  Document Tito Persons Fall Risk and appropriate interventions in the flowsheet. Outcome: Progressing Towards Goal  Note:   Fall Risk Interventions:  Mobility Interventions: Assess mobility with egress test    Mentation Interventions: Adequate sleep, hydration, pain control    Medication Interventions: Teach patient to arise slowly         History of Falls Interventions: Door open when patient unattended    Resting in bed with eyes closed, no complaints, no distress noted. Safety measures in place, will continue to monitor.

## 2019-11-15 PROCEDURE — 74011250637 HC RX REV CODE- 250/637: Performed by: NURSE PRACTITIONER

## 2019-11-15 PROCEDURE — 65220000003 HC RM SEMIPRIVATE PSYCH

## 2019-11-15 RX ORDER — VALPROIC ACID 250 MG/5ML
500 SOLUTION ORAL ONCE
Status: COMPLETED | OUTPATIENT
Start: 2019-11-15 | End: 2019-11-15

## 2019-11-15 RX ORDER — RISPERIDONE 1 MG/ML
3 SOLUTION ORAL ONCE
Status: COMPLETED | OUTPATIENT
Start: 2019-11-15 | End: 2019-11-15

## 2019-11-15 RX ADMIN — DIVALPROEX SODIUM 1000 MG: 500 TABLET, EXTENDED RELEASE ORAL at 21:26

## 2019-11-15 RX ADMIN — RISPERIDONE 3 MG: 3 TABLET ORAL at 08:25

## 2019-11-15 RX ADMIN — Medication 3 MG: at 23:02

## 2019-11-15 RX ADMIN — VALPROIC ACID 500 MG: 250 SOLUTION ORAL at 23:00

## 2019-11-15 NOTE — PROGRESS NOTES
Problem: Altered Thought Process (Adult/Pediatric)  Goal: *STG: Remains safe in hospital  Outcome: Progressing Towards Goal   Lying quietly in bed with eyes closed, respirations even and unlabored   Q15 min safety monitoring continues

## 2019-11-15 NOTE — PROGRESS NOTES
Problem: Altered Thought Process (Adult/Pediatric)  Goal: *STG: Complies with medication therapy  Outcome: Progressing Towards Goal  Note:   Alert oriented calm. Paranoid delusional related to the president and aliens. Distracted. Guarded. Minimal self disclosure. Visible on the unit for meals. Isolative to his room. Not attending groups. Medication compliant. Encouragement required for medication compliance.       Problem: Altered Thought Process (Adult/Pediatric)  Goal: *STG: Seeks staff when feelings of anxiety and fear arise  Outcome: Not Progressing Towards Goal     Problem: Altered Thought Process (Adult/Pediatric)  Goal: *STG: Attends activities and groups  Outcome: Not Progressing Towards Goal     Problem: Altered Thought Process (Adult/Pediatric)  Goal: Interventions  Outcome: Progressing Towards Goal

## 2019-11-15 NOTE — PROGRESS NOTES
Problem: Altered Thought Process (Adult/Pediatric)  Goal: *STG: Remains safe in hospital  Outcome: Progressing Towards Goal  Note:   Pt is isolative for majority of the shift. Visible for meals. Watching TV with peers following dinner. Guarded and suspicious. Paranoid delusions about president persist. Pt is calm.       Problem: Altered Thought Process (Adult/Pediatric)  Goal: *STG: Attends activities and groups  11/15/2019 1814 by Miguel Angel Solis  Outcome: Not Progressing Towards Goal     Problem: Altered Thought Process (Adult/Pediatric)  Goal: Interventions  11/15/2019 1814 by Miguel Angel Solis  Outcome: Progressing Towards Goal

## 2019-11-15 NOTE — PROGRESS NOTES
Visible on unit, cooperative, minimally engaged with peers. Denies SI or HI and agrees to inform staff if feelings to hsarm self or others occurs.    Problem: Altered Thought Process (Adult/Pediatric)  Goal: *STG: Participates in treatment plan  Outcome: Progressing Towards Goal  Goal: *STG: Remains safe in hospital  Outcome: Progressing Towards Goal  Goal: *STG: Attends activities and groups  Outcome: Progressing Towards Goal

## 2019-11-15 NOTE — PROGRESS NOTES
PSYCHIATRIC PROGRESS NOTE       Patient: Monisha Ibrahim MRN: [id number]  SSN: [id number]    YOB: 1985  Age: 27 y.o. Sex: male      Admit Date: 11/7/2019    LOS: 8 days       Chief Complaint: It is a secret government. Interval History:  Monisha Ibrahim remains pleasantly psychotic. Delusions of grandeur evident. States she invented psychology, dentistry, [employer]. There is a secret government situation and he is not supposed to tell anybody. He showere again yesterday. Not as malodorous as he initially was. He is tolerating his meds and denies adverse effects. Denies si or hi. No management issue. Past Medical History:  Past Medical History:   Diagnosis Date    Marijuana use 11/8/2019    Psychiatric disorder          ALLERGIES:(reviewed/updated 11/15/2019)  No Known Allergies    Laboratory report:  Lab Results   Component Value Date/Time    WBC 11.8 (H) 11/08/2019 08:13 PM    Hemoglobin (POC) 16.7 09/09/2009 05:35 PM    HGB 14.4 11/08/2019 08:13 PM    Hematocrit (POC) 49 09/09/2009 05:35 PM    HCT 42.4 11/08/2019 08:13 PM    PLATELET 378 49/60/3739 08:13 PM    MCV 86.7 11/08/2019 08:13 PM      Lab Results   Component Value Date/Time    Sodium 142 11/07/2019 12:05 PM    Potassium 4.1 11/07/2019 12:05 PM    Chloride 104 11/07/2019 12:05 PM    CO2 32 11/07/2019 12:05 PM    Anion gap 6 11/07/2019 12:05 PM    Glucose 88 11/08/2019 05:49 AM    BUN 10 11/07/2019 12:05 PM    Creatinine 1.03 11/07/2019 12:05 PM    BUN/Creatinine ratio 10 (L) 11/07/2019 12:05 PM    GFR est AA >60 11/07/2019 12:05 PM    GFR est non-AA >60 11/07/2019 12:05 PM    Calcium 9.1 11/07/2019 12:05 PM    Bilirubin, total 0.5 11/07/2019 12:05 PM    AST (SGOT) 18 11/07/2019 12:05 PM    Alk.  phosphatase 77 11/07/2019 12:05 PM    Protein, total 6.7 11/07/2019 12:05 PM    Albumin 4.1 11/07/2019 12:05 PM    Globulin 2.6 11/07/2019 12:05 PM    A-G Ratio 1.6 11/07/2019 12:05 PM    ALT (SGPT) 22 11/07/2019 12:05 PM      Vitals: 11/14/19 1633 11/14/19 1953 11/15/19 0743 11/15/19 1648   BP: 134/80 127/89 110/64 131/82   Pulse: (!) 109 (!) 102 91 90   Resp: 16 16 16 16   Temp: 98 °F (36.7 °C) 98.5 °F (36.9 °C) 98.2 °F (36.8 °C) 98 °F (36.7 °C)   SpO2: 96% 98% 98% 98%   Weight:       Height:           Lab Results   Component Value Date/Time    Valproic acid 79 11/12/2019 06:00 AM     No results found for: LITHM    Vital Signs  Patient Vitals for the past 24 hrs:   Temp Pulse Resp BP SpO2   11/15/19 1648 98 °F (36.7 °C) 90 16 131/82 98 %   11/15/19 0743 98.2 °F (36.8 °C) 91 16 110/64 98 %   11/14/19 1953 98.5 °F (36.9 °C) (!) 102 16 127/89 98 %     Wt Readings from Last 3 Encounters:   11/10/19 69.7 kg (153 lb 9.6 oz)   11/07/19 72.7 kg (160 lb 4.4 oz)     Temp Readings from Last 3 Encounters:   11/15/19 98 °F (36.7 °C)   11/07/19 97.8 °F (36.6 °C)     BP Readings from Last 3 Encounters:   11/15/19 131/82   11/07/19 138/86     Pulse Readings from Last 3 Encounters:   11/15/19 90   11/07/19 61       Radiology (reviewed/updated 11/15/2019)  No results found. Side Effects: (reviewed/updated 11/15/2019)  None reported or admitted to. Review of Systems: (reviewed/updated 11/15/2019)  Appetite: good  Sleep: good   All other Review of Systems: delusional    Mental Status Exam:  Eye contact: Good eye contact  Psychomotor activity: Relaxed   Speech is pressured  Thought process: loose disorganized  Mood is \"ok\"  Affect: Constricted  Perception: No avh  Thought content: grandiose  Suicidal ideation: No si  Homicidal ideation: No hi  Insight/judgment: Poor  Cognition is grossly intact      Physical Exam:  Musculoskeletal system: steady gait  Tremor not present  Cog wheeling not present      Assessment and Plan:  Prem Marroquin meets criteria for a diagnosis of Schizophrenia, chronic on acute exacerbation. Va level this Sunday. Continue current medications as prescribed. We will closely monitor for safety.   We will encourage reality orientation. Disposition planning to continue. I certify that this patients inpatient psychiatric hospital services furnished since the previous certification were, and continue to be, required for treatment that could reasonably be expected to improve the patient's condition, or for diagnostic study, and that the patient continues to need, on a daily basis, active treatment furnished directly by or requiring the supervision of inpatient psychiatric facility personnel. In addition, the hospital records show that services furnished were intensive treatment services, admission or related services, or equivalent services.       Signed:  Chauncy Libman, NP  11/15/2019

## 2019-11-15 NOTE — INTERDISCIPLINARY ROUNDS
Behavioral Health Interdisciplinary Rounds     Patient Name: Chinyere Crawford  Age: 27 y.o. Room/Bed:  726/  Primary Diagnosis: <principal problem not specified>   Admission Status: Involuntary Commitment and Forced Medication Order     Readmission within 30 days: no  Power of  in place: no  Patient requires a blocked bed: no          Reason for blocked bed:   Sleep hours:  6      Participation in Care/Groups:  no  Medication Compliant?: Yes  PRNS (last 24 hours): None    Restraints (last 24 hours):  no     Alcohol screening (AUDIT) completed -  AUDIT Score: 0  If applicable, date SBIRT discussed in treatment team AND documented:    Tobacco - patient is a smoker: Have You Used Tobacco in the Past 30 Days: No  Illegal Drugs use: Have You Used Any Illegal Substances Over the Past 12 Months: Yes    24 hour chart check complete: yes     Patient goal(s) for today:   Treatment team focus/goals:    Plan to continue to titrate his medications. Plan to encourage self ADL care . Progress note: he remains isolative on the unit. Compliant with his medications and treatment. He did take a shower. SW spoke to mom about discharge next week.     Family Contact information:  mother Fausto Taylor - [phone]   Patient's preferred phone number for follow up call :    LOS:  8  Expected LOS: Monday - Tuesday     Participating treatment team members: JOHN Beauchamp [phone]

## 2019-11-16 PROCEDURE — 65220000003 HC RM SEMIPRIVATE PSYCH

## 2019-11-16 PROCEDURE — 74011250637 HC RX REV CODE- 250/637: Performed by: NURSE PRACTITIONER

## 2019-11-16 RX ORDER — VALPROIC ACID 250 MG/5ML
250 SOLUTION ORAL 4 TIMES DAILY
Status: DISCONTINUED | OUTPATIENT
Start: 2019-11-16 | End: 2019-11-19 | Stop reason: HOSPADM

## 2019-11-16 RX ORDER — RISPERIDONE 1 MG/ML
3 SOLUTION ORAL ONCE
Status: COMPLETED | OUTPATIENT
Start: 2019-11-16 | End: 2019-11-16

## 2019-11-16 RX ORDER — RISPERIDONE 1 MG/ML
3 SOLUTION ORAL 2 TIMES DAILY
Status: DISCONTINUED | OUTPATIENT
Start: 2019-11-16 | End: 2019-11-19 | Stop reason: HOSPADM

## 2019-11-16 RX ADMIN — VALPROIC ACID 250 MG: 250 SOLUTION ORAL at 11:38

## 2019-11-16 RX ADMIN — VALPROIC ACID 250 MG: 250 SOLUTION ORAL at 17:27

## 2019-11-16 RX ADMIN — Medication 3 MG: at 09:48

## 2019-11-16 RX ADMIN — RISPERIDONE 3 MG: 1 SOLUTION ORAL at 18:02

## 2019-11-16 RX ADMIN — VALPROIC ACID 250 MG: 250 SOLUTION ORAL at 13:57

## 2019-11-16 RX ADMIN — VALPROIC ACID 250 MG: 250 SOLUTION ORAL at 21:17

## 2019-11-16 NOTE — BH NOTES
Pt states he is unable to swallow pills due to \"that pill last night. They gave me liquid medicine\"  Pt request liquid form of Risperdal.   Per Gloria Aschoff NP verbal order with read back: give 3 mg oral solution Risperdal this morning.  NP will see pt in treatment team.   Pt made aware and is coming onto the unit to eat breakfast.

## 2019-11-16 NOTE — PROGRESS NOTES
Chief Complaint:  \"I am doing good\"    Interval History:  Patient is calm and cooperative. He is delusional and states, \"I am apart of the government, the president to be exact. \" Patient denies SI/HI/ AVH but observed thought blocking and responding internally to himself. Patient is isolative to self. No issues noted at this time. Past Medical History:  Past Medical History:   Diagnosis Date    Marijuana use 11/8/2019    Psychiatric disorder            Labs:  Lab Results   Component Value Date/Time    WBC 11.8 (H) 11/08/2019 08:13 PM    Hemoglobin (POC) 16.7 09/09/2009 05:35 PM    HGB 14.4 11/08/2019 08:13 PM    Hematocrit (POC) 49 09/09/2009 05:35 PM    HCT 42.4 11/08/2019 08:13 PM    PLATELET 058 97/26/5989 08:13 PM    MCV 86.7 11/08/2019 08:13 PM      Lab Results   Component Value Date/Time    Sodium 142 11/07/2019 12:05 PM    Potassium 4.1 11/07/2019 12:05 PM    Chloride 104 11/07/2019 12:05 PM    CO2 32 11/07/2019 12:05 PM    Anion gap 6 11/07/2019 12:05 PM    Glucose 88 11/08/2019 05:49 AM    BUN 10 11/07/2019 12:05 PM    Creatinine 1.03 11/07/2019 12:05 PM    BUN/Creatinine ratio 10 (L) 11/07/2019 12:05 PM    GFR est AA >60 11/07/2019 12:05 PM    GFR est non-AA >60 11/07/2019 12:05 PM    Calcium 9.1 11/07/2019 12:05 PM    Bilirubin, total 0.5 11/07/2019 12:05 PM    AST (SGOT) 18 11/07/2019 12:05 PM    Alk.  phosphatase 77 11/07/2019 12:05 PM    Protein, total 6.7 11/07/2019 12:05 PM    Albumin 4.1 11/07/2019 12:05 PM    Globulin 2.6 11/07/2019 12:05 PM    A-G Ratio 1.6 11/07/2019 12:05 PM    ALT (SGPT) 22 11/07/2019 12:05 PM      Vitals:    11/15/19 0743 11/15/19 1648 11/15/19 2013 11/16/19 0803   BP: 110/64 131/82 124/76 121/84   Pulse: 91 90 99 87   Resp: 16 16 14 16   Temp: 98.2 °F (36.8 °C) 98 °F (36.7 °C) 97.7 °F (36.5 °C) 97.7 °F (36.5 °C)   SpO2: 98% 98%  96%   Weight:       Height:             Current Facility-Administered Medications   Medication Dose Route Frequency Provider Last Rate Last Dose    valproic acid (as sodium salt) (DEPAKENE) 250 mg/5 mL (5 mL) oral solution 250 mg  250 mg Oral QID Pierce Beasley, NP   250 mg at 11/16/19 1357    risperiDONE (RisperDAL) tablet 3 mg  3 mg Oral BID Maria Craig, NP   3 mg at 11/15/19 0825    Or    haloperidol lactate (HALDOL) injection 5 mg  5 mg IntraMUSCular BID Maria Craig NP        OLANZapine (ZyPREXA) tablet 5 mg  5 mg Oral Q6H PRN Junior Valentino MD        haloperidol lactate (HALDOL) injection 5 mg  5 mg IntraMUSCular Q6H PRN Junior Valentino MD        benztropine (COGENTIN) tablet 1 mg  1 mg Oral BID PRN Junior Valentino MD        diphenhydrAMINE (BENADRYL) injection 50 mg  50 mg IntraMUSCular BID PRN Junior Valentino MD        hydrOXYzine HCl (ATARAX) tablet 50 mg  50 mg Oral TID PRN Junior Valentino MD        LORazepam (ATIVAN) injection 1 mg  1 mg IntraMUSCular Q4H PRN Junior Valentino MD   1 mg at 11/07/19 1751    traZODone (DESYREL) tablet 50 mg  50 mg Oral QHS PRN Junior Valentino MD   50 mg at 11/11/19 2108    acetaminophen (TYLENOL) tablet 650 mg  650 mg Oral Q4H PRN Junior Valentino MD        magnesium hydroxide (MILK OF MAGNESIA) 400 mg/5 mL oral suspension 30 mL  30 mL Oral DAILY PRN Junior Valentino MD           Mental Status Exam:  Eye contact: Poor  Psychomotor activity: calm  Speech is spontaneous  Mood is constricted  Affect:guarded  Perception:denies HI but is delusional and thinks he works for government. Suicidal ideation: denies SI or plan  Cognition is grossly intact         Physical Exam:  Body habitus:  Musculoskeletal system:   Tremor -neg  Cog wheeling-neg      Assessment and Plan:  Aliza Simms meets criteria for a diagnosis of Schizophrenia. Continue the medication regimen as prescribed  Disposition planning to continue.    I certify that this patients inpatient psychiatric hospital services furnished since the previous certification were, and continue to be, required for treatment that could reasonably be expected to improve the patient's condition, or for diagnostic study, and that the patient continues to need, on a daily basis, active treatment furnished directly by or requiring the supervision of inpatient psychiatric facility personnel. In addition, the hospital records show that services furnished were intensive treatment services, admission or related services, or equivalent services.

## 2019-11-16 NOTE — INTERDISCIPLINARY ROUNDS
Behavioral Health Interdisciplinary Rounds     Patient Name: Esme Gaming  Age: 27 y.o.   Room/Bed:  736/  Primary Diagnosis: <principal problem not specified>   Admission Status: Involuntary Commitment and Forced Medication Order     Readmission within 30 days: no  Power of  in place: no  Patient requires a blocked bed: no          Reason for blocked bed:     Sleep hours:  7.25      Participation in Care/Groups:  no  Medication Compliant?: Yes  PRNS (last 24 hours): None    Restraints (last 24 hours):  no     Alcohol screening (AUDIT) completed -  AUDIT Score: 0  If applicable, date SBIRT discussed in treatment team AND documented:    Tobacco - patient is a smoker: Have You Used Tobacco in the Past 30 Days: No  Illegal Drugs use: Have You Used Any Illegal Substances Over the Past 12 Months: Yes    24 hour chart check complete: yes     Patient goal(s) for today:   Treatment team focus/goals:   Progress note  Family Contact information:   Patient's preferred phone number for follow up call :    LOS:  9  Expected LOS:    Participating treatment team members: Esme Tulare, * (assigned SW),

## 2019-11-16 NOTE — PROGRESS NOTES
Problem: Altered Thought Process (Adult/Pediatric)  Goal: *STG: Participates in treatment plan  11/16/2019 1614 by Kvng Davidson  Outcome: Progressing Towards Goal  Note:   Calm cooperative. Mild thought blocking. Medication and meal compliant. Staff encourage pt to shower.      Problem: Altered Thought Process (Adult/Pediatric)  Goal: *STG: Remains safe in hospital  11/16/2019 1614 by Kvng Davidson  Outcome: Progressing Towards Goal     Problem: Altered Thought Process (Adult/Pediatric)  Goal: *STG: Complies with medication therapy  11/16/2019 1614 by Kvng Davidson  Outcome: Progressing Towards Goal     Problem: Altered Thought Process (Adult/Pediatric)  Goal: Interventions  11/16/2019 1614 by Kvng Davidson  Outcome: Progressing Towards Goal  11/16/2019 1149 by Kvng Davidson  Outcome: Progressing Towards Goal

## 2019-11-16 NOTE — PROGRESS NOTES
Problem: Altered Thought Process (Adult/Pediatric)  Goal: *STG: Participates in treatment plan  Outcome: Progressing Towards Goal  Note:   Medication adjustments made per pt request for oral solution medications. Pt remains paranoid and delusional however appears more trusting of staff (and process). Increased time spent on the unit. Pt encouraged to shower. Goal not met.       Problem: Altered Thought Process (Adult/Pediatric)  Goal: *STG: Remains safe in hospital  Outcome: Progressing Towards Goal     Problem: Altered Thought Process (Adult/Pediatric)  Goal: Interventions  Outcome: Progressing Towards Goal

## 2019-11-16 NOTE — PROGRESS NOTES
In bed asleep with respirations noted as even and unlabored as chest was rising and falling. Will continue to monitor for safety and 15 minute checks throughout shift. [employer]

## 2019-11-17 LAB — VALPROATE SERPL-MCNC: 91 UG/ML (ref 50–100)

## 2019-11-17 PROCEDURE — 80164 ASSAY DIPROPYLACETIC ACD TOT: CPT

## 2019-11-17 PROCEDURE — 36415 COLL VENOUS BLD VENIPUNCTURE: CPT

## 2019-11-17 PROCEDURE — 74011250637 HC RX REV CODE- 250/637: Performed by: NURSE PRACTITIONER

## 2019-11-17 PROCEDURE — 65220000003 HC RM SEMIPRIVATE PSYCH

## 2019-11-17 RX ADMIN — VALPROIC ACID 250 MG: 250 SOLUTION ORAL at 08:43

## 2019-11-17 RX ADMIN — VALPROIC ACID 250 MG: 250 SOLUTION ORAL at 21:06

## 2019-11-17 RX ADMIN — RISPERIDONE 3 MG: 1 SOLUTION ORAL at 17:00

## 2019-11-17 RX ADMIN — VALPROIC ACID 250 MG: 250 SOLUTION ORAL at 17:00

## 2019-11-17 RX ADMIN — VALPROIC ACID 250 MG: 250 SOLUTION ORAL at 12:39

## 2019-11-17 RX ADMIN — RISPERIDONE 3 MG: 1 SOLUTION ORAL at 08:37

## 2019-11-17 NOTE — PROGRESS NOTES
Chief Complaint:  \"I am fine\"    Interval History:  Patient denies SI/HI/AVH but is delusional and thinks he is currently off of work due to having an off day from the [employer]. Depakote level was 91. Patient is calm and cooperative on unit. Remains isolative to self and has very poor hygiene. Patient preoccupied with going home next week and states, \"I have to get back to work so when can I leave? Past Medical History:  Past Medical History:   Diagnosis Date    Marijuana use 11/8/2019    Psychiatric disorder            Labs:  Lab Results   Component Value Date/Time    WBC 11.8 (H) 11/08/2019 08:13 PM    Hemoglobin (POC) 16.7 09/09/2009 05:35 PM    HGB 14.4 11/08/2019 08:13 PM    Hematocrit (POC) 49 09/09/2009 05:35 PM    HCT 42.4 11/08/2019 08:13 PM    PLATELET 024 54/86/1871 08:13 PM    MCV 86.7 11/08/2019 08:13 PM      Lab Results   Component Value Date/Time    Sodium 142 11/07/2019 12:05 PM    Potassium 4.1 11/07/2019 12:05 PM    Chloride 104 11/07/2019 12:05 PM    CO2 32 11/07/2019 12:05 PM    Anion gap 6 11/07/2019 12:05 PM    Glucose 88 11/08/2019 05:49 AM    BUN 10 11/07/2019 12:05 PM    Creatinine 1.03 11/07/2019 12:05 PM    BUN/Creatinine ratio 10 (L) 11/07/2019 12:05 PM    GFR est AA >60 11/07/2019 12:05 PM    GFR est non-AA >60 11/07/2019 12:05 PM    Calcium 9.1 11/07/2019 12:05 PM    Bilirubin, total 0.5 11/07/2019 12:05 PM    AST (SGOT) 18 11/07/2019 12:05 PM    Alk.  phosphatase 77 11/07/2019 12:05 PM    Protein, total 6.7 11/07/2019 12:05 PM    Albumin 4.1 11/07/2019 12:05 PM    Globulin 2.6 11/07/2019 12:05 PM    A-G Ratio 1.6 11/07/2019 12:05 PM    ALT (SGPT) 22 11/07/2019 12:05 PM      Vitals:    11/16/19 0803 11/16/19 1630 11/17/19 0917 11/17/19 0921   BP: 121/84 (!) 135/91 108/78    Pulse: 87 92 82    Resp: 16 18 16    Temp: 97.7 °F (36.5 °C) 97.9 °F (36.6 °C) 97.9 °F (36.6 °C)    SpO2: 96% 99% 99%    Weight:    71.5 kg (157 lb 11.2 oz)   Height:             Current Facility-Administered Medications   Medication Dose Route Frequency Provider Last Rate Last Dose    valproic acid (as sodium salt) (DEPAKENE) 250 mg/5 mL (5 mL) oral solution 250 mg  250 mg Oral QID Oneal June, NP   250 mg at 11/17/19 1700    risperiDONE (RisperDAL) 1 mg/mL oral solution soln 3 mg  3 mg Oral BID Oneal June, NP   3 mg at 11/17/19 1700    haloperidol lactate (HALDOL) injection 5 mg  5 mg IntraMUSCular BID Maria Craig NP        OLANZapine (ZyPREXA) tablet 5 mg  5 mg Oral Q6H PRN Stacia Antis, MD        haloperidol lactate (HALDOL) injection 5 mg  5 mg IntraMUSCular Q6H PRN Stacia Antis, MD        benztropine (COGENTIN) tablet 1 mg  1 mg Oral BID PRN Stacia Antis, MD        diphenhydrAMINE (BENADRYL) injection 50 mg  50 mg IntraMUSCular BID PRN Stacia Antis, MD        hydrOXYzine HCl (ATARAX) tablet 50 mg  50 mg Oral TID PRN Stacia Antis, MD        LORazepam (ATIVAN) injection 1 mg  1 mg IntraMUSCular Q4H PRN Stacia Antis, MD   1 mg at 11/07/19 1751    traZODone (DESYREL) tablet 50 mg  50 mg Oral QHS PRN Stacia Antis MD   50 mg at 11/11/19 2108    acetaminophen (TYLENOL) tablet 650 mg  650 mg Oral Q4H PRN Stacia Vega MD        magnesium hydroxide (MILK OF MAGNESIA) 400 mg/5 mL oral suspension 30 mL  30 mL Oral DAILY PRN Stacia Antis, MD           Mental Status Exam:  Eye contact: Poor   Psychomotor activity: calm  Speech is spontaneous  Mood is congruent  Affect: flat  Perception: Denies HI/AVH  Suicidal ideation: Denies SI or plan  Cognition is grossly intact         Physical Exam:  Body habitus:  Musculoskeletal system:   Tremor -neg  Cog wheeling-neg      Assessment and Plan:  Shayne Corona meets criteria for a diagnosis of Schizophrenia     Continue the medication regimen as prescribed  Disposition planning to continue.    I certify that this patients inpatient psychiatric hospital services furnished since the previous certification were, and continue to be, required for treatment that could reasonably be expected to improve the patient's condition, or for diagnostic study, and that the patient continues to need, on a daily basis, active treatment furnished directly by or requiring the supervision of inpatient psychiatric facility personnel. In addition, the hospital records show that services furnished were intensive treatment services, admission or related services, or equivalent services.

## 2019-11-17 NOTE — PROGRESS NOTES
Problem: Altered Thought Process (Adult/Pediatric)  Goal: *STG: Remains safe in hospital  Outcome: Progressing Towards Goal  Note:   Pt alert oriented. Calm. Cooperative. Pleasant. Guarded. Minimal self disclosure. Increased time spent out on unit. Medication meal compliant. Pt preference oral solution (easy to swallow). Denies side effect to medications. Staff encourage pt to shower today.       Problem: Altered Thought Process (Adult/Pediatric)  Goal: *STG: Complies with medication therapy  Outcome: Progressing Towards Goal

## 2019-11-17 NOTE — INTERDISCIPLINARY ROUNDS
Behavioral Health Interdisciplinary Rounds     Patient Name: Elías Johnson  Age: 27 y.o.   Room/Bed:  6/  Primary Diagnosis: <principal problem not specified>   Admission Status: Involuntary Commitment and Forced Medication Order     Readmission within 30 days: no  Power of  in place: no  Patient requires a blocked bed: no          Reason for blocked bed:     Sleep hours:  7.5      Participation in Care/Groups:  no  Medication Compliant?: Yes  PRNS (last 24 hours): None    Restraints (last 24 hours):  no     Alcohol screening (AUDIT) completed -  AUDIT Score: 0  If applicable, date SBIRT discussed in treatment team AND documented:    Tobacco - patient is a smoker: Have You Used Tobacco in the Past 30 Days: No  Illegal Drugs use: Have You Used Any Illegal Substances Over the Past 12 Months: Yes    24 hour chart check complete: yes     Patient goal(s) for today:   Treatment team focus/goals:   Progress note  Family Contact information:   Patient's preferred phone number for follow up call :    LOS:  10  Expected LOS:    Participating treatment team members: Elías Johnson, * (assigned SW),

## 2019-11-17 NOTE — PROGRESS NOTES
Problem: Altered Thought Process (Adult/Pediatric)  Goal: *STG: Remains safe in hospital  11/17/2019 1603 by Floridalma Queen  Outcome: Progressing Towards Goal  Note:   Delusional thoughts reduced but present. Medication meal compliant. Blunted affect.       Problem: Altered Thought Process (Adult/Pediatric)  Goal: *STG: Decreased delusional thinking  Outcome: Progressing Towards Goal     Problem: Altered Thought Process (Adult/Pediatric)  Goal: Interventions  11/17/2019 1603 by Floridalma Queen  Outcome: Progressing Towards Goal  11/17/2019 1025 by Floridalma Queen  Outcome: Progressing Towards Goal

## 2019-11-18 PROCEDURE — 65220000003 HC RM SEMIPRIVATE PSYCH

## 2019-11-18 PROCEDURE — 74011250637 HC RX REV CODE- 250/637: Performed by: NURSE PRACTITIONER

## 2019-11-18 RX ADMIN — VALPROIC ACID 250 MG: 250 SOLUTION ORAL at 21:04

## 2019-11-18 RX ADMIN — RISPERIDONE 3 MG: 1 SOLUTION ORAL at 08:50

## 2019-11-18 RX ADMIN — VALPROIC ACID 250 MG: 250 SOLUTION ORAL at 08:49

## 2019-11-18 RX ADMIN — RISPERIDONE 3 MG: 1 SOLUTION ORAL at 17:15

## 2019-11-18 RX ADMIN — VALPROIC ACID 250 MG: 250 SOLUTION ORAL at 17:15

## 2019-11-18 RX ADMIN — VALPROIC ACID 250 MG: 250 SOLUTION ORAL at 12:46

## 2019-11-18 NOTE — GROUP NOTE
[facility]  GROUP DOCUMENTATION INDIVIDUAL Group Therapy Note Date: November 18 Group Start Time: 6454 Group End Time: 0097 Group Topic: [employer] [address] Lia Yen [facility] GROUP DOCUMENTATION GROUP Group Therapy Note Attendees: 8/9 attended Attendance: Attended Patient's Goal:  \"give gorgeous speech's, and get released\" Interventions/techniques: Informed Follows Directions: Followed directions Interactions: Interacted appropriately Mental Status: Calm Behavior/appearance: Cooperative Goals Achieved: Able to engage in interactions Additional Notes:  Patient was calm and collected, was able to make own decisions with prompting. Symone Nascimento

## 2019-11-18 NOTE — PROGRESS NOTES
Pt calm and cooperative, med and meal compliant. Visible on unit, in day room watching Tv.     Problem: Altered Thought Process (Adult/Pediatric)  Goal: *STG: Participates in treatment plan  Outcome: Progressing Towards Goal  Goal: *STG: Remains safe in hospital  Outcome: Progressing Towards Goal  Goal: *STG: Complies with medication therapy  Outcome: Progressing Towards Goal  Goal: *STG: Attends activities and groups  Outcome: Progressing Towards Goal

## 2019-11-18 NOTE — PROGRESS NOTES
Problem: Altered Thought Process (Adult/Pediatric)  Goal: *STG: Participates in treatment plan  Outcome: Progressing Towards Goal  Note:   Patient is participatory in treatment team  Goal: *STG: Remains safe in hospital  Outcome: Progressing Towards Goal  Note:   Safe  Goal: *STG: Seeks staff when feelings of anxiety and fear arise  Outcome: Not Progressing Towards Goal  Note:   Does not seek  Goal: *STG: Complies with medication therapy  Outcome: Progressing Towards Goal  Note:   Compliant  Goal: Interventions  Outcome: Progressing Towards Goal

## 2019-11-18 NOTE — PROGRESS NOTES
PSYCHIATRIC PROGRESS NOTE       Patient: Jazz Hernandez MRN: [id number]  SSN: [id number]    YOB: 1985  Age: 27 y.o. Sex: male      Admit Date: 11/7/2019    LOS: 11 days       Chief Complaint:  I am the former president. Interval History:  Jazz Hernandez is calm and pleasant. His hygiene is better. Grandiosity remains but this appears to be his baseline. Despite his delusions he is calm and does not poses any behavioral issues in the unit. 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 He is compliant with his meds and denies side effects. Joseph Linaress. Slept over 6°. Denies si hi or avh. Past Medical History:  Past Medical History:   Diagnosis Date    Marijuana use 11/8/2019    Psychiatric disorder          ALLERGIES:(reviewed/updated 11/18/2019)  No Known Allergies    Laboratory report:  Lab Results   Component Value Date/Time    WBC 11.8 (H) 11/08/2019 08:13 PM    Hemoglobin (POC) 16.7 09/09/2009 05:35 PM    HGB 14.4 11/08/2019 08:13 PM    Hematocrit (POC) 49 09/09/2009 05:35 PM    HCT 42.4 11/08/2019 08:13 PM    PLATELET 826 46/66/2173 08:13 PM    MCV 86.7 11/08/2019 08:13 PM      Lab Results   Component Value Date/Time    Sodium 142 11/07/2019 12:05 PM    Potassium 4.1 11/07/2019 12:05 PM    Chloride 104 11/07/2019 12:05 PM    CO2 32 11/07/2019 12:05 PM    Anion gap 6 11/07/2019 12:05 PM    Glucose 88 11/08/2019 05:49 AM    BUN 10 11/07/2019 12:05 PM    Creatinine 1.03 11/07/2019 12:05 PM    BUN/Creatinine ratio 10 (L) 11/07/2019 12:05 PM    GFR est AA >60 11/07/2019 12:05 PM    GFR est non-AA >60 11/07/2019 12:05 PM    Calcium 9.1 11/07/2019 12:05 PM    Bilirubin, total 0.5 11/07/2019 12:05 PM    AST (SGOT) 18 11/07/2019 12:05 PM    Alk.  phosphatase 77 11/07/2019 12:05 PM    Protein, total 6.7 11/07/2019 12:05 PM    Albumin 4.1 11/07/2019 12:05 PM    Globulin 2.6 11/07/2019 12:05 PM    A-G Ratio 1.6 11/07/2019 12:05 PM    ALT (SGPT) 22 11/07/2019 12:05 PM      Vitals: 11/17/19 0917 11/17/19 0921 11/17/19 1734 11/18/19 0734   BP: 108/78  118/84 135/89   Pulse: 82  97 98   Resp: 16  16 16   Temp: 97.9 °F (36.6 °C)  98 °F (36.7 °C) 97.8 °F (36.6 °C)   SpO2: 99%  97% 99%   Weight:  71.5 kg (157 lb 11.2 oz)     Height:           Lab Results   Component Value Date/Time    Valproic acid 91 11/17/2019 06:14 AM     No results found for: LITHM    Vital Signs  Patient Vitals for the past 24 hrs:   Temp Pulse Resp BP SpO2   11/18/19 0734 97.8 °F (36.6 °C) 98 16 135/89 99 %   11/17/19 1734 98 °F (36.7 °C) 97 16 118/84 97 %     Wt Readings from Last 3 Encounters:   11/17/19 71.5 kg (157 lb 11.2 oz)   11/07/19 72.7 kg (160 lb 4.4 oz)     Temp Readings from Last 3 Encounters:   11/18/19 97.8 °F (36.6 °C)   11/07/19 97.8 °F (36.6 °C)     BP Readings from Last 3 Encounters:   11/18/19 135/89   11/07/19 138/86     Pulse Readings from Last 3 Encounters:   11/18/19 98   11/07/19 61       Radiology (reviewed/updated 11/18/2019)  No results found. Side Effects: (reviewed/updated 11/18/2019)  None reported or admitted to. Review of Systems: (reviewed/updated 11/18/2019)  Appetite: good  Sleep: good   All other Review of Systems: delusional    Mental Status Exam:  Eye contact: Good eye contact  Psychomotor activity: Relaxed   Speech is spontaneous  Thought process:  disorganized  Mood is \"ok\"  Affect:  full  Perception: No avh  Thought content: grandiose  Suicidal ideation: No si  Homicidal ideation: No hi  Insight/judgment: Poor  Cognition is grossly intact      Physical Exam:  Musculoskeletal system: steady gait  Tremor not present  Cog wheeling not present      Assessment and Plan:  Lashanda Mcbride meets criteria for a diagnosis of Schizophrenia, chronic on acute exacerbation. Continue current medications as prescribed. We will closely monitor for safety. We will encourage reality orientation. Plan for discharge in the next 24-48 hours.       I certify that this patients inpatient psychiatric hospital services furnished since the previous certification were, and continue to be, required for treatment that could reasonably be expected to improve the patient's condition, or for diagnostic study, and that the patient continues to need, on a daily basis, active treatment furnished directly by or requiring the supervision of inpatient psychiatric facility personnel. In addition, the hospital records show that services furnished were intensive treatment services, admission or related services, or equivalent services.       Signed:  Becka Reyes NP  11/18/2019

## 2019-11-18 NOTE — PROGRESS NOTES
Laboratory Monitoring for Divalproex/Valproic Acid: This patient is currently prescribed the following medication(s):   Current Facility-Administered Medications   Medication Dose Route Frequency    valproic acid (as sodium salt) (DEPAKENE) 250 mg/5 mL (5 mL) oral solution 250 mg  250 mg Oral QID    risperiDONE (RisperDAL) 1 mg/mL oral solution soln 3 mg  3 mg Oral BID     The following labs have been completed for monitoring of valproic acid:    Valproic Acid Serum Concentration  Lab Results   Component Value Date/Time    Valproic acid 91 11/17/2019 06:14 AM       Hepatic Function  Lab Results   Component Value Date/Time    Bilirubin, total 0.5 11/07/2019 12:05 PM    Protein, total 6.7 11/07/2019 12:05 PM    Albumin 4.1 11/07/2019 12:05 PM    Globulin 2.6 11/07/2019 12:05 PM    A-G Ratio 1.6 11/07/2019 12:05 PM    ALT (SGPT) 22 11/07/2019 12:05 PM    Alk. phosphatase 77 11/07/2019 12:05 PM     Hematology  Lab Results   Component Value Date/Time    WBC 11.8 (H) 11/08/2019 08:13 PM    RBC 4.89 11/08/2019 08:13 PM    HGB 14.4 11/08/2019 08:13 PM    HCT 42.4 11/08/2019 08:13 PM    MCV 86.7 11/08/2019 08:13 PM    MCH 29.4 11/08/2019 08:13 PM    MCHC 34.0 11/08/2019 08:13 PM    RDW 11.9 11/08/2019 08:13 PM    PLATELET 759 65/66/3990 08:13 PM     Assessment/Plan:  A valproic acid level was drawn on 11/17 @ 0614 and found to be 91 mcg/mL. The patient received 3 days of 1000mg/day prior to this level. On 11/15, patient received 1000mg ER QHS, then starting on 11/16 the patient received 250mg QID (liquid). Based on this level, recommend continuing current total daily dose and formulation. Could consider changing to 500mg BID (liquid).      Gwen Yepez, PharmD, BCPP, [facility], [address] [facility]

## 2019-11-18 NOTE — INTERDISCIPLINARY ROUNDS
Behavioral Health Interdisciplinary Rounds     Patient Name: Flora Dhaliwal  Age: 27 y.o. Room/Bed:  [facility]  Primary Diagnosis: <principal problem not specified>   Admission Status: Involuntary Commitment and Forced Medication Order     Readmission within 30 days: no  Power of  in place: no  Patient requires a blocked bed: no          Reason for blocked bed:   Sleep hours:        Participation in Care/Groups:  no  Medication Compliant?: Yes  PRNS (last 24 hours): None    Restraints (last 24 hours):  no     Alcohol screening (AUDIT) completed -  AUDIT Score: 0  If applicable, date SBIRT discussed in treatment team AND documented:    Tobacco - patient is a smoker: Have You Used Tobacco in the Past 30 Days: No  Illegal Drugs use: Have You Used Any Illegal Substances Over the Past 12 Months: Yes    24 hour chart check complete: yes     Patient goal(s) for today:   Treatment team focus/goals: Plan for discharge on Tuesday   Progress note He has been complaint with his medications. Remains delusional and guarded.     Family Contact information: SW left a message for his mother   Patient's preferred phone number for follow up call :     LOS:  11  Expected LOS: tomorrow     Participating treatment team members: Flora Peress,  [address] [facility]

## 2019-11-18 NOTE — PROGRESS NOTES
2300: Patient resting quietly in bed with eyes closed. No distress noted. Respirations are even and unlabored. Staff will continue to monitor q15 throughout the shift. Problem: Falls - Risk of  Goal: *Absence of Falls  Description  Document Fernando Andino Fall Risk and appropriate interventions in the flowsheet.   Outcome: Progressing Towards Goal  Note:   Fall Risk Interventions:  Mobility Interventions: Assess mobility with egress test    Mentation Interventions: Adequate sleep, hydration, pain control    Medication Interventions: Teach patient to arise slowly         History of Falls Interventions: Door open when patient unattended

## 2019-11-19 VITALS
RESPIRATION RATE: 16 BRPM | DIASTOLIC BLOOD PRESSURE: 75 MMHG | OXYGEN SATURATION: 100 % | HEIGHT: 73 IN | BODY MASS INDEX: 20.9 KG/M2 | SYSTOLIC BLOOD PRESSURE: 113 MMHG | HEART RATE: 76 BPM | WEIGHT: 157.7 LBS | TEMPERATURE: 98.3 F

## 2019-11-19 PROCEDURE — 74011250637 HC RX REV CODE- 250/637: Performed by: NURSE PRACTITIONER

## 2019-11-19 RX ORDER — RISPERIDONE 3 MG/1
3 TABLET, FILM COATED ORAL 2 TIMES DAILY
Qty: 60 TAB | Refills: 0 | Status: SHIPPED | OUTPATIENT
Start: 2019-11-19 | End: 2021-11-10

## 2019-11-19 RX ORDER — DIVALPROEX SODIUM 250 MG/1
1000 TABLET, EXTENDED RELEASE ORAL
Qty: 120 TAB | Refills: 0 | Status: SHIPPED | OUTPATIENT
Start: 2019-11-19 | End: 2021-11-10

## 2019-11-19 RX ADMIN — VALPROIC ACID 250 MG: 250 SOLUTION ORAL at 09:00

## 2019-11-19 RX ADMIN — VALPROIC ACID 250 MG: 250 SOLUTION ORAL at 17:03

## 2019-11-19 RX ADMIN — RISPERIDONE 3 MG: 1 SOLUTION ORAL at 09:00

## 2019-11-19 RX ADMIN — VALPROIC ACID 250 MG: 250 SOLUTION ORAL at 13:04

## 2019-11-19 RX ADMIN — RISPERIDONE 3 MG: 1 SOLUTION ORAL at 17:03
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
## 2019-11-19 NOTE — BH NOTES
GROUP THERAPY PROGRESS NOTE    Darryl Severiano Clos did not participate in an Acute Unit Process Group, with a focus on identifying feelings, planning for the rest of the day, and developing coping skills.
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
## 2019-11-19 NOTE — BH NOTES
Discharge instructions have been reviewed and belongings have been returned. Pt is escorted off unit to meet mom for discharge.

## 2019-11-19 NOTE — DISCHARGE INSTRUCTIONS
DISCHARGE SUMMARY    Ttio Ortez  : 1985  MRN: [id number]    The patient Monisha Ibrahim exhibits the ability to control behavior in a less restrictive environment. Patient's level of functioning is improving. No assaultive/destructive behavior has been observed for the past 24 hours. No suicidal/homicidal threat or behavior has been observed for the past 24 hours. There is no evidence of serious medication side effects. Patient has not been in physical or protective restraints for at least the past 24 hours. If weapons involved, how are they secured? no weapons involved     Is patient aware of and in agreement with discharge plan? Patient is aware of discharge and is in agreement     Arrangements for medication:  Prescriptions given to patient. Copy of discharge instructions to provider?:  Yes, fax to [address] [place] [address] for transportation home:  Mother to  at 5 pm     Keep all follow up appointments as scheduled, continue to take prescribed medications per physician instructions. Mental health crisis number:  915 or your local mental health crisis line number at [address] [facility]    Thank you for requesting access to [address] [place] [address]. Please follow the instructions below to securely access and download your online medical record. [email or web address] allows you to send messages to your doctor, view your test results, renew your prescriptions, schedule appointments, and more. How Do I Sign Up? 1. In your internet browser, go to [email or web address]  2. Click on the First Time User? Click Here link in the Sign In box. You will be redirect to the New Member Sign Up page. 3. Enter your [email or web address] Access Code exactly as it appears below. You will not need to use this code after youve completed the sign-up process. If you do not sign up before the expiration date, you must request a new code.     [email or web address] Access Code: [email or web address]  Expires: 2019 11:15 AM (This is the date your [employer] access code will )    4. Enter the last four digits of your Social Security Number (xxxx) and Date of Birth (mm/dd/yyyy) as indicated and click Submit. You will be taken to the next sign-up page. 5. Create a Alfredt ID. This will be your [employer] login ID and cannot be changed, so think of one that is secure and easy to remember. 6. Create a [employer] password. You can change your password at any time. 7. Enter your Password Reset Question and Answer. This can be used at a later time if you forget your password. 8. Enter your e-mail address. You will receive e-mail notification when new information is available in [address] 9. Click Sign Up. You can now view and download portions of your medical record. 10. Click the Download Summary menu link to download a portable copy of your medical information. Additional Information    If you have questions, please visit the Frequently Asked Questions section of the [employer] website at [email or web address] Remember, [employer] is NOT to be used for urgent needs. For medical emergencies, dial 911.

## 2019-11-19 NOTE — INTERDISCIPLINARY ROUNDS
Behavioral Health Interdisciplinary Rounds     Patient Name: Dorian Barnes  Age: 27 y.o. Room/Bed:  6/  Primary Diagnosis: <principal problem not specified>   Admission Status: Involuntary Commitment and Forced Medication Order     Readmission within 30 days: no  Power of  in place: no  Patient requires a blocked bed: no          Reason for blocked bed:     Sleep hours:  6.5        Participation in Care/Groups:  no  Medication Compliant?: Yes  PRNS (last 24 hours): None    Restraints (last 24 hours):  no     Alcohol screening (AUDIT) completed -  AUDIT Score: 0  If applicable, date SBIRT discussed in treatment team AND documented:    Tobacco - patient is a smoker: Have You Used Tobacco in the Past 30 Days: No  Illegal Drugs use: Have You Used Any Illegal Substances Over the Past 12 Months: Yes    24 hour chart check complete: yes     Patient goal(s) for today:   Treatment team focus/goals: plan for discharge today. Progress note : plan for discharge today   Family Contact information: SW spoke to his mom about discharge today.   She will  after work at 5   Patient's preferred phone number for follow up call :     LOS:  12  Expected LOS: TBD    Participating treatment team members: Dorian Barnes,  Maria Guadalupe Ewing, Neeta Vaughn

## 2019-11-19 NOTE — PROGRESS NOTES
Discharge instructions reviewed with patient during treatment team he verbalized understanding. Patient received all valuables and scripts. Awaiting arrival of mother to transport home at approximately 1700. Problem: Altered Thought Process (Adult/Pediatric)  Goal: *STG: Remains safe in hospital  Note:   Pt denies any suicidal or homicidal thoughts. Contracts for safety. Remains on q 15 min safety checks. An agitation or aggression displayed. Problem: Altered Thought Process (Adult/Pediatric)  Goal: *STG: Seeks staff when feelings of anxiety and fear arise  Outcome: Not Progressing Towards Goal  Note:   Isolates to self with minimal interaction with staff and peers. Problem: Altered Thought Process (Adult/Pediatric)  Goal: *STG: Complies with medication therapy  Note:   Has been medication compliant. Focus for patient has been hope for discharge today.      Problem: Altered Thought Process (Adult/Pediatric)  Goal: *STG: Decreased delusional thinking  Outcome: Not Progressing Towards Goal  Note:   When directly question will express fixed delusions about \"aliens and contacting President persist.\"

## 2019-11-19 NOTE — PROGRESS NOTES
2300: Patient resting quietly in bed with eyes closed. No distress noted. Respirations are even and unlabored. Staff will continue to monitor q15 throughout the shift    Problem: Falls - Risk of  Goal: *Absence of Falls  Description  Document Gisele Puentekevin Fall Risk and appropriate interventions in the flowsheet.   Outcome: Progressing Towards Goal  Note:   Fall Risk Interventions:  Mobility Interventions: Assess mobility with egress test    Mentation Interventions: Adequate sleep, hydration, pain control    Medication Interventions: Teach patient to arise slowly         History of Falls Interventions: Door open when patient unattended

## 2020-02-04 ENCOUNTER — HOSPITAL ENCOUNTER (EMERGENCY)
Age: 35
Discharge: HOME | End: 2020-02-04
Attending: EMERGENCY MEDICINE | Admitting: EMERGENCY MEDICINE
Payer: MEDICAID

## 2020-02-04 VITALS
RESPIRATION RATE: 16 BRPM | HEART RATE: 79 BPM | OXYGEN SATURATION: 99 % | SYSTOLIC BLOOD PRESSURE: 137 MMHG | TEMPERATURE: 98.3 F | DIASTOLIC BLOOD PRESSURE: 92 MMHG

## 2020-02-04 DIAGNOSIS — F20.9 SCHIZOPHRENIA, UNSPECIFIED TYPE (HCC): Primary | ICD-10-CM

## 2020-02-04 PROCEDURE — 99282 EMERGENCY DEPT VISIT SF MDM: CPT

## 2020-02-04 PROCEDURE — 90791 PSYCH DIAGNOSTIC EVALUATION: CPT
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
## 2020-02-04 NOTE — ED PROVIDER NOTES
HPI     26-year-old male with a history of schizophrenia disorder presents emergency department after getting into a verbal argument with a family member. He denies feeling suicidal or homicidal.  He denies any alcohol or substance abuse. He states he just needs someone to talk to. He denies any ongoing medical issues. He states he has been eating okay and sleeping okay. He states he does see a psychiatrist and takes Abilify. Patient is off his medication. Family called b/c he was threatening to kill family. 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 Past Medical History:   Diagnosis Date    Marijuana use 11/8/2019    Psychiatric disorder        History reviewed. No pertinent surgical history. No family history on file.     Social History     Socioeconomic History    Marital status: SINGLE     Spouse name: Not on file    Number of children: Not on file    Years of education: Not on file    Highest education level: Not on file   Occupational History    Not on file   Social Needs    Financial resource strain: Not on file    Food insecurity:     Worry: Not on file     Inability: Not on file    Transportation needs:     Medical: Not on file     Non-medical: Not on file   Tobacco Use    Smoking status: Never Smoker    Smokeless tobacco: Never Used   Substance and Sexual Activity    Alcohol use: Yes     Frequency: Monthly or less     Drinks per session: 1 or 2     Comment: occasional    Drug use: Yes     Types: Marijuana    Sexual activity: Not Currently   Lifestyle    Physical activity:     Days per week: Not on file     Minutes per session: Not on file    Stress: Not on file   Relationships    Social connections:     Talks on phone: Not on file     Gets together: Not on file     Attends Quaker service: Not on file     Active member of club or organization: Not on file     Attends meetings of clubs or organizations: Not on file     Relationship status: Not on file    Intimate partner violence: Fear of current or ex partner: Not on file     Emotionally abused: Not on file     Physically abused: Not on file     Forced sexual activity: Not on file   Other Topics Concern     Service Not Asked    Blood Transfusions Not Asked    Caffeine Concern Not Asked    Occupational Exposure Not Asked    Hobby Hazards Not Asked    Sleep Concern Not Asked    Stress Concern Not Asked    Weight Concern Not Asked    Special Diet Not Asked    Back Care Not Asked    Exercise Not Asked    Bike Helmet Not Asked   [address] Not Asked    Self-Exams Not Asked   Social History Narrative    Not on file         ALLERGIES: Patient has no known allergies. Review of Systems   Constitutional: Negative for fever. HENT: Negative for congestion. Eyes: Negative for visual disturbance. Respiratory: Negative for cough and shortness of breath. Cardiovascular: Negative for chest pain. Gastrointestinal: Negative for abdominal pain. Endocrine: Negative for polyuria. Genitourinary: Negative for dysuria. Skin: Negative for rash. Neurological: Negative for headaches. Psychiatric/Behavioral: Positive for behavioral problems and dysphoric mood. Negative for hallucinations, self-injury, sleep disturbance and suicidal ideas. Vitals:    02/04/20 0044   BP: (!) 135/92   Pulse: 100   Resp: 16   Temp: 98.8 °F (37.1 °C)   SpO2: 96%            Physical Exam  Constitutional:       General: He is not in acute distress. Appearance: He is well-developed. HENT:      Head: Normocephalic and atraumatic. Mouth/Throat:      Pharynx: No oropharyngeal exudate. Eyes:      General: No scleral icterus. Right eye: No discharge. Left eye: No discharge. Pupils: Pupils are equal, round, and reactive to light. Neck:      Musculoskeletal: Normal range of motion and neck supple. Vascular: No JVD. Cardiovascular:      Rate and Rhythm: Normal rate and regular rhythm.       Heart sounds: Normal heart sounds. No murmur. Pulmonary:      Effort: Pulmonary effort is normal. No respiratory distress. Breath sounds: Normal breath sounds. No stridor. No wheezing or rales. Chest:      Chest wall: No tenderness. Abdominal:      General: Bowel sounds are normal. There is no distension. Palpations: Abdomen is soft. There is no mass. Tenderness: There is no abdominal tenderness. There is no guarding or rebound. Musculoskeletal: Normal range of motion. Skin:     General: Skin is warm and dry. Capillary Refill: Capillary refill takes less than 2 seconds. Findings: No rash. Neurological:      Mental Status: He is oriented to person, place, and time. Psychiatric:         Behavior: Behavior normal.         Thought Content: Thought content normal.         Judgment: Judgment normal.          MDM       Procedures      Calm, cooperative. Mental health eval.          Seen by [facility]- does not feel needs admission.  Will d/c./

## 2020-02-04 NOTE — ED TRIAGE NOTES
He arrives with [employer]. They report he had an encounter with his brother. He was volunteer to come here. He is a schizophrenic and has not been taking his medication since being released from the hospital a month ago. 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 He says he has been taking it.

## 2020-02-04 NOTE — DISCHARGE INSTRUCTIONS
Patient Education        Schizophrenia: Care Instructions  Your Care Instructions  Schizophrenia is a disease that makes it hard to think clearly, manage emotions, and interact with other people. It can cause:  · Delusions. These are beliefs that are not real.  · Hallucinations. These are things that you may see or hear that are not really there. · Paranoia. This is the belief that others are lying, cheating, using you, or trying to harm you. The disease may change your ability to enjoy life, express emotions, or function. At times, you may hear voices, behave strangely, have trouble speaking or understanding speech, or keep to yourself. The goal of treatment is to lower your stress and help your brain function normally. You may need lifelong treatment with medicines and counseling to keep your schizophrenia under control. When schizophrenia is not treated, the risks are higher for suicide, a hospital stay, and other problems. Early treatment called coordinated specialty care [facility]) may help a person who is having his or her first episode of psychotic thoughts. Ask your doctor about [address]. Follow-up care is a key part of your treatment and safety. Be sure to make and go to all appointments, and call your doctor if you are having problems. It's also a good idea to know your test results and keep a list of the medicines you take. How can you care for yourself at home? · Be safe with medicines. Take your medicines exactly as prescribed. Call your doctor if you think you are having a problem with your medicine. When you feel good, you may think that you do not need your medicines. But it is important to keep taking them as scheduled. · Go to your counseling sessions. Call and talk with your counselor if you can't attend or if you don't think the sessions are helping. Do not just stop going. · Eat a healthy diet. Talk with a dietitian about what type of diet may be best for you.   · Do not use alcohol or illegal drugs.  · Keep the numbers for these national suicide hotlines: 5-026-226-TALK (4-591.173.1003) and 8-807-UIVODBK (4-972.277.3804). If you or someone you know talks about suicide or feeling hopeless, get help right away. What should you do if someone in your family has schizophrenia? · Learn about the disease and how it may get worse over time. · Remind your family member that you love him or her. · Make a plan with all family members about how to take care of your loved one when his or her symptoms are bad. · Talk about your fears and concerns and those of other family members. Seek counseling if needed. · Do not focus attention only on the person who is in treatment. · Remind yourself that it will take time for changes to occur. · Do not blame yourself for the disease. · Know your legal rights and the legal rights of your family member. · Take care of yourself. Stay involved with your own interests, such as your career, hobbies, and friends. Use exercise, positive self-talk, relaxation, and deep breathing to help lower your stress. · Give yourself time to grieve. You may need to deal with emotions such as anger, fear, and frustration. After you work through your feelings, you will be better able to care for yourself and your family. · If you are having a hard time with your feelings and your interactions with your family member, talk with a counselor. When should you call for help? Call 911 anytime you think you may need emergency care.  For example, call if:    · You are thinking about suicide or are threatening suicide.     · You feel like hurting yourself or someone else.    Call your doctor now or seek immediate medical care if:    · You hear voices.     · You think someone is trying to harm you.     · You cannot concentrate or are easily confused.    Watch closely for changes in your health, and be sure to contact your doctor if:    · You are having trouble taking care of yourself.     · You cannot attend your counseling sessions. Where can you learn more? Go to http://sal-cecile.info/. Enter Z392 in the search box to learn more about \"Schizophrenia: Care Instructions. \"  Current as of: May 28, 2019  Content Version: 12.2  © 3342-3083 [employer]. Care instructions adapted under license by [employer] (which disclaims liability or warranty for this information). If you have questions about a medical condition or this instruction, always ask your healthcare professional. [address] any warranty or liability for your use of this information.

## 2020-02-04 NOTE — ED NOTES
Discharge instructions given and reviewed with pt the patient. Verbalized understanding. Transported home by [facility] PD that initially brought pt in.

## 2020-11-17 NOTE — PROGRESS NOTES
Problem: Falls - Risk of  Goal: *Absence of Falls  Description  Document Yehuda Host Fall Risk and appropriate interventions in the flowsheet. Outcome: Progressing Towards Goal  Note:   Fall Risk Interventions:  Mobility Interventions: Assess mobility with egress test    Mentation Interventions: Adequate sleep, hydration, pain control    Medication Interventions: Teach patient to arise slowly         History of Falls Interventions: Door open when patient unattended, Room close to nurse's station      Resting in bed with eyes closed, no complaints, no distress noted. Safety measures in place, will continue to monitor.         Blocked Bed Documentation:     Room number: 726/02  Type: Behavior  Rationale: Poor Self-Control and poor hygiene, patient refusing to perform daily ADLs  Anticipated duration: hospital duration or Tx team decision    FAMILY HISTORY:  Family history of breast cancer  Family history of diabetes mellitus (DM)  Family history of early CAD

## 2021-05-25 ENCOUNTER — TRANSCRIBE ORDER (OUTPATIENT)
Dept: SCHEDULING | Age: 36
End: 2021-05-25

## 2021-05-25 DIAGNOSIS — R22.42 MASS OF LOWER LEG, LEFT: Primary | ICD-10-CM

## 2021-05-25 DIAGNOSIS — M72.2 PLANTAR FASCIAL FIBROMATOSIS: ICD-10-CM

## 2021-06-02 ENCOUNTER — HOSPITAL ENCOUNTER (OUTPATIENT)
Dept: MRI IMAGING | Age: 36
Discharge: HOME | End: 2021-06-02
Attending: PODIATRIST
Payer: MEDICAID

## 2021-06-02 DIAGNOSIS — R22.42 MASS OF LOWER LEG, LEFT: ICD-10-CM

## 2021-06-02 DIAGNOSIS — M72.2 PLANTAR FASCIAL FIBROMATOSIS: ICD-10-CM

## 2021-06-02 PROCEDURE — 74011250636 HC RX REV CODE- 250/636: Performed by: PODIATRIST

## 2021-06-02 PROCEDURE — 73720 MRI LWR EXTREMITY W/O&W/DYE: CPT

## 2021-06-02 PROCEDURE — A9575 INJ GADOTERATE MEGLUMI 0.1ML: HCPCS | Performed by: PODIATRIST

## 2021-06-02 RX ORDER — GADOTERATE MEGLUMINE 376.9 MG/ML
20 INJECTION INTRAVENOUS
Status: COMPLETED | OUTPATIENT
Start: 2021-06-02 | End: 2021-06-02

## 2021-06-02 RX ADMIN — GADOTERATE MEGLUMINE 17 ML: 376.9 INJECTION INTRAVENOUS at 11:25

## 2021-10-05 ENCOUNTER — TRANSCRIBE ORDER (OUTPATIENT)
Dept: REGISTRATION | Age: 36
End: 2021-10-05

## 2021-10-05 DIAGNOSIS — Z01.812 ENCOUNTER FOR PREOPERATIVE SCREENING LABORATORY TESTING FOR COVID-19 VIRUS: Primary | ICD-10-CM

## 2021-10-05 DIAGNOSIS — Z20.822 ENCOUNTER FOR PREOPERATIVE SCREENING LABORATORY TESTING FOR COVID-19 VIRUS: Primary | ICD-10-CM

## 2021-11-04 ENCOUNTER — TRANSCRIBE ORDER (OUTPATIENT)
Dept: REGISTRATION | Age: 36
End: 2021-11-04

## 2021-11-04 DIAGNOSIS — Z01.812 PRE-PROCEDURE LAB EXAM: Primary | ICD-10-CM

## 2021-11-09 ENCOUNTER — TRANSCRIBE ORDER (OUTPATIENT)
Dept: REGISTRATION | Age: 36
End: 2021-11-09

## 2021-11-09 ENCOUNTER — HOSPITAL ENCOUNTER (OUTPATIENT)
Dept: PREADMISSION TESTING | Age: 36
Discharge: HOME | End: 2021-11-09
Payer: MEDICAID

## 2021-11-09 DIAGNOSIS — Z01.812 PRE-PROCEDURE LAB EXAM: Primary | ICD-10-CM

## 2021-11-09 DIAGNOSIS — Z01.812 PRE-PROCEDURE LAB EXAM: ICD-10-CM

## 2021-11-09 PROCEDURE — U0005 INFEC AGEN DETEC AMPLI PROBE: HCPCS

## 2021-11-10 LAB
SARS-COV-2, XPLCVT: NOT DETECTED
SOURCE, COVRS: NORMAL

## 2021-11-10 RX ORDER — SODIUM CHLORIDE 0.9 % (FLUSH) 0.9 %
5-40 SYRINGE (ML) INJECTION EVERY 8 HOURS
Status: CANCELLED | OUTPATIENT
Start: 2021-11-10

## 2021-11-10 RX ORDER — SODIUM CHLORIDE, SODIUM LACTATE, POTASSIUM CHLORIDE, CALCIUM CHLORIDE 600; 310; 30; 20 MG/100ML; MG/100ML; MG/100ML; MG/100ML
125 INJECTION, SOLUTION INTRAVENOUS CONTINUOUS
Status: CANCELLED | OUTPATIENT
Start: 2021-11-10 | End: 2021-11-11

## 2021-11-10 RX ORDER — CHOLECALCIFEROL (VITAMIN D3) 125 MCG
2000 CAPSULE ORAL DAILY
COMMUNITY

## 2021-11-10 RX ORDER — HYDROMORPHONE HYDROCHLORIDE 1 MG/ML
0.2 INJECTION, SOLUTION INTRAMUSCULAR; INTRAVENOUS; SUBCUTANEOUS
Status: CANCELLED | OUTPATIENT
Start: 2021-11-10

## 2021-11-10 RX ORDER — FENTANYL CITRATE 50 UG/ML
50 INJECTION, SOLUTION INTRAMUSCULAR; INTRAVENOUS AS NEEDED
Status: CANCELLED | OUTPATIENT
Start: 2021-11-10

## 2021-11-10 RX ORDER — MIDAZOLAM HYDROCHLORIDE 1 MG/ML
0.5 INJECTION, SOLUTION INTRAMUSCULAR; INTRAVENOUS
Status: CANCELLED | OUTPATIENT
Start: 2021-11-10

## 2021-11-10 RX ORDER — ACETAMINOPHEN 500 MG
1000 TABLET ORAL ONCE
Status: CANCELLED | OUTPATIENT
Start: 2021-11-10 | End: 2021-11-10

## 2021-11-10 RX ORDER — OXYCODONE AND ACETAMINOPHEN 5; 325 MG/1; MG/1
1 TABLET ORAL AS NEEDED
Status: CANCELLED | OUTPATIENT
Start: 2021-11-10

## 2021-11-10 RX ORDER — DIPHENHYDRAMINE HYDROCHLORIDE 50 MG/ML
12.5 INJECTION, SOLUTION INTRAMUSCULAR; INTRAVENOUS AS NEEDED
Status: CANCELLED | OUTPATIENT
Start: 2021-11-10 | End: 2021-11-10

## 2021-11-10 RX ORDER — EPHEDRINE SULFATE/0.9% NACL/PF 50 MG/5 ML
5 SYRINGE (ML) INTRAVENOUS AS NEEDED
Status: CANCELLED | OUTPATIENT
Start: 2021-11-10

## 2021-11-10 RX ORDER — SODIUM CHLORIDE 0.9 % (FLUSH) 0.9 %
5-40 SYRINGE (ML) INJECTION AS NEEDED
Status: CANCELLED | OUTPATIENT
Start: 2021-11-10

## 2021-11-10 RX ORDER — ARIPIPRAZOLE 30 MG/1
30 TABLET ORAL
COMMUNITY

## 2021-11-10 RX ORDER — SODIUM CHLORIDE 9 MG/ML
125 INJECTION, SOLUTION INTRAVENOUS CONTINUOUS
Status: CANCELLED | OUTPATIENT
Start: 2021-11-10 | End: 2021-11-11

## 2021-11-10 RX ORDER — ONDANSETRON 2 MG/ML
4 INJECTION INTRAMUSCULAR; INTRAVENOUS AS NEEDED
Status: CANCELLED | OUTPATIENT
Start: 2021-11-10

## 2021-11-10 RX ORDER — LIDOCAINE HYDROCHLORIDE 10 MG/ML
0.1 INJECTION, SOLUTION EPIDURAL; INFILTRATION; INTRACAUDAL; PERINEURAL AS NEEDED
Status: CANCELLED | OUTPATIENT
Start: 2021-11-10

## 2021-11-10 RX ORDER — MIDAZOLAM HYDROCHLORIDE 1 MG/ML
1 INJECTION, SOLUTION INTRAMUSCULAR; INTRAVENOUS AS NEEDED
Status: CANCELLED | OUTPATIENT
Start: 2021-11-10

## 2021-11-10 RX ORDER — SODIUM CHLORIDE, SODIUM LACTATE, POTASSIUM CHLORIDE, CALCIUM CHLORIDE 600; 310; 30; 20 MG/100ML; MG/100ML; MG/100ML; MG/100ML
1000 INJECTION, SOLUTION INTRAVENOUS CONTINUOUS
Status: CANCELLED | OUTPATIENT
Start: 2021-11-10

## 2021-11-10 RX ORDER — SODIUM CHLORIDE 9 MG/ML
1000 INJECTION, SOLUTION INTRAVENOUS CONTINUOUS
Status: CANCELLED | OUTPATIENT
Start: 2021-11-10

## 2021-11-10 RX ORDER — FENTANYL CITRATE 50 UG/ML
25 INJECTION, SOLUTION INTRAMUSCULAR; INTRAVENOUS
Status: CANCELLED | OUTPATIENT
Start: 2021-11-10

## 2021-11-10 RX ORDER — MORPHINE SULFATE 2 MG/ML
2 INJECTION, SOLUTION INTRAMUSCULAR; INTRAVENOUS
Status: CANCELLED | OUTPATIENT
Start: 2021-11-10

## 2021-11-11 ENCOUNTER — HOSPITAL ENCOUNTER (OUTPATIENT)
Age: 36
Setting detail: OUTPATIENT SURGERY
Discharge: HOME | End: 2021-11-11
Attending: PODIATRIST | Admitting: PODIATRIST

## 2021-12-30 ENCOUNTER — TRANSCRIBE ORDER (OUTPATIENT)
Dept: REGISTRATION | Age: 36
End: 2021-12-30

## 2021-12-30 DIAGNOSIS — Z01.812 PRE-PROCEDURE LAB EXAM: Primary | ICD-10-CM

## 2022-01-10 ENCOUNTER — HOSPITAL ENCOUNTER (OUTPATIENT)
Dept: PREADMISSION TESTING | Age: 37
Discharge: HOME | End: 2022-01-10
Attending: PODIATRIST
Payer: MEDICAID

## 2022-01-10 DIAGNOSIS — Z01.812 PRE-PROCEDURE LAB EXAM: ICD-10-CM

## 2022-01-10 LAB
SARS-COV-2, XPLCVT: NOT DETECTED
SOURCE, COVRS: NORMAL

## 2022-01-10 PROCEDURE — U0005 INFEC AGEN DETEC AMPLI PROBE: HCPCS

## 2022-01-13 ENCOUNTER — ANESTHESIA (OUTPATIENT)
Dept: MEDSURG UNIT | Age: 37
End: 2022-01-13

## 2022-01-13 ENCOUNTER — HOSPITAL ENCOUNTER (OUTPATIENT)
Age: 37
Setting detail: OUTPATIENT SURGERY
Discharge: OTHER INSTITUTION | End: 2022-01-13
Attending: PODIATRIST | Admitting: PODIATRIST

## 2022-01-13 ENCOUNTER — ANESTHESIA EVENT (OUTPATIENT)
Dept: MEDSURG UNIT | Age: 37
End: 2022-01-13

## 2022-01-13 VITALS — HEIGHT: 71 IN | WEIGHT: 200 LBS | BODY MASS INDEX: 28 KG/M2

## 2022-01-13 RX ORDER — FENTANYL CITRATE 50 UG/ML
50 INJECTION, SOLUTION INTRAMUSCULAR; INTRAVENOUS AS NEEDED
Status: DISCONTINUED | OUTPATIENT
Start: 2022-01-13 | End: 2022-01-14 | Stop reason: HOSPADM

## 2022-01-13 RX ORDER — MIDAZOLAM HYDROCHLORIDE 1 MG/ML
0.5 INJECTION, SOLUTION INTRAMUSCULAR; INTRAVENOUS
Status: CANCELLED | OUTPATIENT
Start: 2022-01-13

## 2022-01-13 RX ORDER — ONDANSETRON 2 MG/ML
4 INJECTION INTRAMUSCULAR; INTRAVENOUS AS NEEDED
Status: CANCELLED | OUTPATIENT
Start: 2022-01-13

## 2022-01-13 RX ORDER — LIDOCAINE HYDROCHLORIDE 10 MG/ML
0.1 INJECTION, SOLUTION EPIDURAL; INFILTRATION; INTRACAUDAL; PERINEURAL AS NEEDED
Status: DISCONTINUED | OUTPATIENT
Start: 2022-01-13 | End: 2022-01-14 | Stop reason: HOSPADM

## 2022-01-13 RX ORDER — SODIUM CHLORIDE, SODIUM LACTATE, POTASSIUM CHLORIDE, CALCIUM CHLORIDE 600; 310; 30; 20 MG/100ML; MG/100ML; MG/100ML; MG/100ML
125 INJECTION, SOLUTION INTRAVENOUS CONTINUOUS
Status: CANCELLED | OUTPATIENT
Start: 2022-01-13

## 2022-01-13 RX ORDER — MORPHINE SULFATE 2 MG/ML
2 INJECTION, SOLUTION INTRAMUSCULAR; INTRAVENOUS
Status: CANCELLED | OUTPATIENT
Start: 2022-01-13

## 2022-01-13 RX ORDER — HYDROMORPHONE HYDROCHLORIDE 1 MG/ML
0.2 INJECTION, SOLUTION INTRAMUSCULAR; INTRAVENOUS; SUBCUTANEOUS
Status: CANCELLED | OUTPATIENT
Start: 2022-01-13

## 2022-01-13 RX ORDER — SODIUM CHLORIDE 0.9 % (FLUSH) 0.9 %
5-40 SYRINGE (ML) INJECTION EVERY 8 HOURS
Status: CANCELLED | OUTPATIENT
Start: 2022-01-13

## 2022-01-13 RX ORDER — SODIUM CHLORIDE 0.9 % (FLUSH) 0.9 %
5-40 SYRINGE (ML) INJECTION AS NEEDED
Status: CANCELLED | OUTPATIENT
Start: 2022-01-13

## 2022-01-13 RX ORDER — SODIUM CHLORIDE, SODIUM LACTATE, POTASSIUM CHLORIDE, CALCIUM CHLORIDE 600; 310; 30; 20 MG/100ML; MG/100ML; MG/100ML; MG/100ML
125 INJECTION, SOLUTION INTRAVENOUS CONTINUOUS
Status: DISCONTINUED | OUTPATIENT
Start: 2022-01-13 | End: 2022-01-14 | Stop reason: HOSPADM

## 2022-01-13 RX ORDER — DIPHENHYDRAMINE HYDROCHLORIDE 50 MG/ML
12.5 INJECTION, SOLUTION INTRAMUSCULAR; INTRAVENOUS AS NEEDED
Status: CANCELLED | OUTPATIENT
Start: 2022-01-13 | End: 2022-01-13

## 2022-01-13 RX ORDER — OXYCODONE AND ACETAMINOPHEN 5; 325 MG/1; MG/1
1 TABLET ORAL AS NEEDED
Status: CANCELLED | OUTPATIENT
Start: 2022-01-13

## 2022-01-13 RX ORDER — SODIUM CHLORIDE 0.9 % (FLUSH) 0.9 %
5-40 SYRINGE (ML) INJECTION EVERY 8 HOURS
Status: DISCONTINUED | OUTPATIENT
Start: 2022-01-13 | End: 2022-01-14 | Stop reason: HOSPADM

## 2022-01-13 RX ORDER — ACETAMINOPHEN 325 MG/1
650 TABLET ORAL ONCE
Status: DISCONTINUED | OUTPATIENT
Start: 2022-01-13 | End: 2022-01-13 | Stop reason: HOSPADM

## 2022-01-13 RX ORDER — SODIUM CHLORIDE 9 MG/ML
25 INJECTION, SOLUTION INTRAVENOUS CONTINUOUS
Status: DISCONTINUED | OUTPATIENT
Start: 2022-01-13 | End: 2022-01-14 | Stop reason: HOSPADM

## 2022-01-13 RX ORDER — FENTANYL CITRATE 50 UG/ML
25 INJECTION, SOLUTION INTRAMUSCULAR; INTRAVENOUS
Status: CANCELLED | OUTPATIENT
Start: 2022-01-13

## 2022-01-13 RX ORDER — SODIUM CHLORIDE 0.9 % (FLUSH) 0.9 %
5-40 SYRINGE (ML) INJECTION AS NEEDED
Status: DISCONTINUED | OUTPATIENT
Start: 2022-01-13 | End: 2022-01-14 | Stop reason: HOSPADM

## 2022-01-13 RX ORDER — MIDAZOLAM HYDROCHLORIDE 1 MG/ML
1 INJECTION, SOLUTION INTRAMUSCULAR; INTRAVENOUS AS NEEDED
Status: DISCONTINUED | OUTPATIENT
Start: 2022-01-13 | End: 2022-01-14 | Stop reason: HOSPADM

## 2022-01-13 NOTE — PROGRESS NOTES
Surgery cancelled today. Patient lives in a group facility in a 2nd floor room. Spoke with Rylie Fish at the facility, she states that per the licensing at the facility Mr. Kenan Gomez will not be able to return to the facility post operatively since he will be non-weight bearing with crutches. Discussed options with patient and his mother. His mother states that she cannot have Mr. Kenan Gomez stay with her because he was banned from her house, she has a small apartment, and she has no money for food. Discussed options with patient. Surgery cancelled for today and will look to reschedule when there is a safe discharge/post operative plan in place.

## 2022-01-13 NOTE — PROGRESS NOTES
Surgery cancelled per Dr. Angelic Joya.  Rylie Fish contact # [phone]. Pt mother Gisell Hernandez contact # [phone].

## 2022-03-18 PROBLEM — D72.829 LEUKOCYTOSIS: Status: ACTIVE | Noted: 2019-11-08

## 2022-03-19 PROBLEM — F12.90 MARIJUANA USE: Status: ACTIVE | Noted: 2019-11-08

## 2022-03-19 PROBLEM — F20.9 SCHIZOPHRENIA (HCC): Status: ACTIVE | Noted: 2019-11-07

## 2023-05-12 RX ORDER — ARIPIPRAZOLE 30 MG/1
30 TABLET ORAL NIGHTLY
COMMUNITY